# Patient Record
Sex: FEMALE | HISPANIC OR LATINO | Employment: FULL TIME | URBAN - METROPOLITAN AREA
[De-identification: names, ages, dates, MRNs, and addresses within clinical notes are randomized per-mention and may not be internally consistent; named-entity substitution may affect disease eponyms.]

---

## 2018-07-27 ENCOUNTER — APPOINTMENT (OUTPATIENT)
Dept: CT IMAGING | Facility: HOSPITAL | Age: 48
End: 2018-07-27

## 2018-07-27 ENCOUNTER — HOSPITAL ENCOUNTER (EMERGENCY)
Facility: HOSPITAL | Age: 48
Discharge: HOME | End: 2018-07-27
Attending: EMERGENCY MEDICINE | Admitting: EMERGENCY MEDICINE

## 2018-07-27 VITALS
HEIGHT: 64 IN | DIASTOLIC BLOOD PRESSURE: 90 MMHG | RESPIRATION RATE: 18 BRPM | HEART RATE: 61 BPM | BODY MASS INDEX: 27.49 KG/M2 | TEMPERATURE: 98.4 F | OXYGEN SATURATION: 99 % | SYSTOLIC BLOOD PRESSURE: 128 MMHG | WEIGHT: 161 LBS

## 2018-07-27 DIAGNOSIS — R10.31 RIGHT LOWER QUADRANT ABDOMINAL PAIN: Primary | ICD-10-CM

## 2018-07-27 LAB
ALBUMIN SERPL-MCNC: 4.4 G/DL (ref 3.5–5.2)
ALBUMIN/GLOB SERPL: 1.5 G/DL
ALP SERPL-CCNC: 53 U/L (ref 39–117)
ALT SERPL W P-5'-P-CCNC: 20 U/L (ref 1–33)
ANION GAP SERPL CALCULATED.3IONS-SCNC: 12.9 MMOL/L
AST SERPL-CCNC: 14 U/L (ref 1–32)
BACTERIA UR QL AUTO: ABNORMAL /HPF
BASOPHILS # BLD AUTO: 0.01 10*3/MM3 (ref 0–0.2)
BASOPHILS NFR BLD AUTO: 0.2 % (ref 0–1.5)
BILIRUB SERPL-MCNC: 0.3 MG/DL (ref 0.1–1.2)
BILIRUB UR QL STRIP: NEGATIVE
BUN BLD-MCNC: 8 MG/DL (ref 6–20)
BUN/CREAT SERPL: 11 (ref 7–25)
CALCIUM SPEC-SCNC: 8.4 MG/DL (ref 8.6–10.5)
CHLORIDE SERPL-SCNC: 102 MMOL/L (ref 98–107)
CLARITY UR: ABNORMAL
CO2 SERPL-SCNC: 23.1 MMOL/L (ref 22–29)
COLOR UR: YELLOW
CREAT BLD-MCNC: 0.73 MG/DL (ref 0.57–1)
DEPRECATED RDW RBC AUTO: 41.7 FL (ref 37–54)
EOSINOPHIL # BLD AUTO: 0.14 10*3/MM3 (ref 0–0.7)
EOSINOPHIL NFR BLD AUTO: 2.1 % (ref 0.3–6.2)
ERYTHROCYTE [DISTWIDTH] IN BLOOD BY AUTOMATED COUNT: 13.2 % (ref 11.7–13)
GFR SERPL CREATININE-BSD FRML MDRD: 104 ML/MIN/1.73
GFR SERPL CREATININE-BSD FRML MDRD: 86 ML/MIN/1.73
GLOBULIN UR ELPH-MCNC: 3 GM/DL
GLUCOSE BLD-MCNC: 87 MG/DL (ref 65–99)
GLUCOSE UR STRIP-MCNC: NEGATIVE MG/DL
HCG SERPL QL: NEGATIVE
HCT VFR BLD AUTO: 36.2 % (ref 35.6–45.5)
HGB BLD-MCNC: 12.1 G/DL (ref 11.9–15.5)
HGB UR QL STRIP.AUTO: ABNORMAL
HYALINE CASTS UR QL AUTO: ABNORMAL /LPF
IMM GRANULOCYTES # BLD: 0.02 10*3/MM3 (ref 0–0.03)
IMM GRANULOCYTES NFR BLD: 0.3 % (ref 0–0.5)
KETONES UR QL STRIP: NEGATIVE
LEUKOCYTE ESTERASE UR QL STRIP.AUTO: ABNORMAL
LYMPHOCYTES # BLD AUTO: 2.48 10*3/MM3 (ref 0.9–4.8)
LYMPHOCYTES NFR BLD AUTO: 37.7 % (ref 19.6–45.3)
MCH RBC QN AUTO: 28.9 PG (ref 26.9–32)
MCHC RBC AUTO-ENTMCNC: 33.4 G/DL (ref 32.4–36.3)
MCV RBC AUTO: 86.4 FL (ref 80.5–98.2)
MONOCYTES # BLD AUTO: 0.28 10*3/MM3 (ref 0.2–1.2)
MONOCYTES NFR BLD AUTO: 4.3 % (ref 5–12)
NEUTROPHILS # BLD AUTO: 3.66 10*3/MM3 (ref 1.9–8.1)
NEUTROPHILS NFR BLD AUTO: 55.7 % (ref 42.7–76)
NITRITE UR QL STRIP: NEGATIVE
PH UR STRIP.AUTO: 6 [PH] (ref 5–8)
PLATELET # BLD AUTO: 244 10*3/MM3 (ref 140–500)
PMV BLD AUTO: 10.6 FL (ref 6–12)
POTASSIUM BLD-SCNC: 3.6 MMOL/L (ref 3.5–5.2)
PROT SERPL-MCNC: 7.4 G/DL (ref 6–8.5)
PROT UR QL STRIP: NEGATIVE
RBC # BLD AUTO: 4.19 10*6/MM3 (ref 3.9–5.2)
RBC # UR: ABNORMAL /HPF
REF LAB TEST METHOD: ABNORMAL
SODIUM BLD-SCNC: 138 MMOL/L (ref 136–145)
SP GR UR STRIP: 1.02 (ref 1–1.03)
SQUAMOUS #/AREA URNS HPF: ABNORMAL /HPF
UROBILINOGEN UR QL STRIP: ABNORMAL
WBC NRBC COR # BLD: 6.57 10*3/MM3 (ref 4.5–10.7)
WBC UR QL AUTO: ABNORMAL /HPF
WHOLE BLOOD HOLD SPECIMEN: NORMAL

## 2018-07-27 PROCEDURE — 25010000002 KETOROLAC TROMETHAMINE PER 15 MG: Performed by: EMERGENCY MEDICINE

## 2018-07-27 PROCEDURE — 80053 COMPREHEN METABOLIC PANEL: CPT | Performed by: NURSE PRACTITIONER

## 2018-07-27 PROCEDURE — 25010000002 IOPAMIDOL 61 % SOLUTION: Performed by: NURSE PRACTITIONER

## 2018-07-27 PROCEDURE — 74177 CT ABD & PELVIS W/CONTRAST: CPT

## 2018-07-27 PROCEDURE — 81001 URINALYSIS AUTO W/SCOPE: CPT | Performed by: NURSE PRACTITIONER

## 2018-07-27 PROCEDURE — 84703 CHORIONIC GONADOTROPIN ASSAY: CPT | Performed by: NURSE PRACTITIONER

## 2018-07-27 PROCEDURE — 96374 THER/PROPH/DIAG INJ IV PUSH: CPT

## 2018-07-27 PROCEDURE — 99283 EMERGENCY DEPT VISIT LOW MDM: CPT

## 2018-07-27 PROCEDURE — 85025 COMPLETE CBC W/AUTO DIFF WBC: CPT | Performed by: NURSE PRACTITIONER

## 2018-07-27 RX ORDER — HYDROCODONE BITARTRATE AND ACETAMINOPHEN 5; 325 MG/1; MG/1
1 TABLET ORAL EVERY 6 HOURS PRN
Qty: 12 TABLET | Refills: 0 | Status: SHIPPED | OUTPATIENT
Start: 2018-07-27 | End: 2018-07-30

## 2018-07-27 RX ORDER — SODIUM CHLORIDE 0.9 % (FLUSH) 0.9 %
10 SYRINGE (ML) INJECTION AS NEEDED
Status: DISCONTINUED | OUTPATIENT
Start: 2018-07-27 | End: 2018-07-27 | Stop reason: HOSPADM

## 2018-07-27 RX ORDER — NAPROXEN 500 MG/1
500 TABLET ORAL 2 TIMES DAILY PRN
Qty: 10 TABLET | Refills: 0 | Status: SHIPPED | OUTPATIENT
Start: 2018-07-27 | End: 2018-08-01

## 2018-07-27 RX ORDER — KETOROLAC TROMETHAMINE 15 MG/ML
15 INJECTION, SOLUTION INTRAMUSCULAR; INTRAVENOUS ONCE
Status: COMPLETED | OUTPATIENT
Start: 2018-07-27 | End: 2018-07-27

## 2018-07-27 RX ADMIN — IOPAMIDOL 85 ML: 612 INJECTION, SOLUTION INTRAVENOUS at 14:59

## 2018-07-27 RX ADMIN — KETOROLAC TROMETHAMINE 15 MG: 15 INJECTION, SOLUTION INTRAMUSCULAR; INTRAVENOUS at 15:56

## 2018-08-27 ENCOUNTER — OFFICE VISIT (OUTPATIENT)
Dept: INTERNAL MEDICINE | Facility: CLINIC | Age: 48
End: 2018-08-27

## 2018-08-27 VITALS
HEART RATE: 80 BPM | SYSTOLIC BLOOD PRESSURE: 102 MMHG | WEIGHT: 164.9 LBS | BODY MASS INDEX: 30.35 KG/M2 | HEIGHT: 62 IN | RESPIRATION RATE: 16 BRPM | DIASTOLIC BLOOD PRESSURE: 70 MMHG | OXYGEN SATURATION: 97 %

## 2018-08-27 DIAGNOSIS — Z13.29 SCREENING FOR THYROID DISORDER: ICD-10-CM

## 2018-08-27 DIAGNOSIS — K21.00 GASTROESOPHAGEAL REFLUX DISEASE WITH ESOPHAGITIS: ICD-10-CM

## 2018-08-27 DIAGNOSIS — Z13.220 SCREENING FOR LIPID DISORDERS: ICD-10-CM

## 2018-08-27 DIAGNOSIS — Z12.39 SCREENING FOR BREAST CANCER: ICD-10-CM

## 2018-08-27 DIAGNOSIS — Z00.00 WELL WOMAN EXAM (NO GYNECOLOGICAL EXAM): Primary | ICD-10-CM

## 2018-08-27 DIAGNOSIS — E55.9 AVITAMINOSIS D: ICD-10-CM

## 2018-08-27 DIAGNOSIS — Z13.1 SCREENING FOR DIABETES MELLITUS: ICD-10-CM

## 2018-08-27 DIAGNOSIS — Z13.21 ENCOUNTER FOR VITAMIN DEFICIENCY SCREENING: ICD-10-CM

## 2018-08-27 DIAGNOSIS — Z23 NEED FOR TDAP VACCINATION: ICD-10-CM

## 2018-08-27 PROCEDURE — 99396 PREV VISIT EST AGE 40-64: CPT | Performed by: FAMILY MEDICINE

## 2018-08-27 PROCEDURE — 99213 OFFICE O/P EST LOW 20 MIN: CPT | Performed by: FAMILY MEDICINE

## 2018-08-27 PROCEDURE — 90471 IMMUNIZATION ADMIN: CPT | Performed by: FAMILY MEDICINE

## 2018-08-27 PROCEDURE — 90715 TDAP VACCINE 7 YRS/> IM: CPT | Performed by: FAMILY MEDICINE

## 2018-08-27 RX ORDER — DEXLANSOPRAZOLE 60 MG/1
60 CAPSULE, DELAYED RELEASE ORAL DAILY
Qty: 30 CAPSULE | Refills: 0 | Status: SHIPPED | OUTPATIENT
Start: 2018-08-27 | End: 2018-09-26

## 2018-08-27 RX ORDER — NAPROXEN 500 MG/1
500 TABLET ORAL 2 TIMES DAILY PRN
Refills: 0 | COMMUNITY
Start: 2018-07-28 | End: 2018-08-27

## 2018-08-27 NOTE — PROGRESS NOTES
Subjective   Minnie Ortiz is a 47 y.o. female and is here for a comprehensive physical exam. The patient reports problems - heartburn..    Pt is due for annual gyn exam and mammo     Patient notes to have heartburn.  Patient states that her acid reflux is significant.  Patient states that she is trying to decrease some of the spice level that she is currently eating.  Patient states that she is eating earlier in the evenings around 5 or 6 PM.  The past patient has tried other OTC medications.    Do you take any herbs or supplements that were not prescribed by a doctor? no      Social History:   Social History     Social History   • Marital status:      Spouse name: N/A   • Number of children: N/A   • Years of education: N/A     Occupational History   • Not on file.     Social History Main Topics   • Smoking status: Never Smoker   • Smokeless tobacco: Never Used   • Alcohol use No   • Drug use: No   • Sexual activity: Not on file     Other Topics Concern   • Not on file     Social History Narrative   • No narrative on file       Family History:   Family History   Problem Relation Age of Onset   • Hypertension Mother    • Diabetes Mother        Past Medical History:   Past Medical History:   Diagnosis Date   • Allergic            Review of Systems    Review of Systems   Constitutional: Negative for chills and fever.   HENT: Negative for congestion, rhinorrhea, sinus pain and sore throat.    Eyes: Negative for photophobia and visual disturbance.   Respiratory: Negative for cough, chest tightness and shortness of breath.    Cardiovascular: Negative for chest pain and palpitations.   Gastrointestinal: Negative for diarrhea, nausea and vomiting.   Genitourinary: Negative for dysuria, frequency and urgency.   Skin: Negative for rash and wound.   Neurological: Negative for dizziness and syncope.   Psychiatric/Behavioral: Negative for behavioral problems and confusion.       Objective   Physical Exam    Constitutional: She is oriented to person, place, and time. She appears well-developed and well-nourished.   HENT:   Head: Normocephalic and atraumatic.   Right Ear: External ear normal.   Left Ear: External ear normal.   Mouth/Throat: Oropharynx is clear and moist.   Eyes: EOM are normal.   Neck: Normal range of motion. Neck supple.   Cardiovascular: Normal rate, regular rhythm and normal heart sounds.    Pulmonary/Chest: Effort normal and breath sounds normal. No respiratory distress.   Abdominal: Soft. There is no tenderness. There is no guarding.   Musculoskeletal: Normal range of motion.   Lymphadenopathy:     She has no cervical adenopathy.   Neurological: She is alert and oriented to person, place, and time.   Skin: Skin is warm.   Psychiatric: She has a normal mood and affect. Her behavior is normal.   Nursing note and vitals reviewed.      Medications:   Current Outpatient Prescriptions:   •  dexlansoprazole (DEXILANT) 60 MG capsule, Take 1 capsule by mouth Daily for 30 days., Disp: 30 capsule, Rfl: 0       Assessment/Plan   Healthy female exam.      1. Healthcare Maintenance:  2. Patient Counseling:  --Nutrition: Stressed importance of moderation in sodium/caffeine intake, saturated fat and cholesterol, caloric balance, sufficient intake of fresh fruits, vegetables, fiber, calcium and vit D  --Exercise: Recommended 30 minutes of exercise daily.  --Immunizations reviewed.  Patient is due for TdaP.  --Discussed benefits of screening mammogram and Pap smear.  We'll refer patient to gynecology.    Diagnoses and all orders for this visit:    Well woman exam (no gynecological exam)  -     Ambulatory Referral to Gynecology  -     Comprehensive Metabolic Panel  -     CBC & Differential    Screening for diabetes mellitus  -     Hemoglobin A1c    Screening for thyroid disorder  -     Thyroid Panel With TSH    Screening for lipid disorders  -     Lipid Panel With LDL / HDL Ratio    Need for Tdap vaccination  -      Tdap Vaccine Greater Than or Equal To 6yo IM    Screening for breast cancer  -     Mammo Screening Bilateral With CAD    Gastroesophageal reflux disease with esophagitis  -     dexlansoprazole (DEXILANT) 60 MG capsule; Take 1 capsule by mouth Daily for 30 days.  -     Discussed the patient lifestyle changes, it appears the patient is already making these lifestyle changes with the decrease in spice level as well as eating earlier in the evenings.  We'll start patient on a PPI Dexilant.     Avitaminosis D  -     Vitamin D 25 Hydroxy    Encounter for vitamin deficiency screening  -     Vitamin D 25 Hydroxy    Other orders  -     Discontinue: naproxen (NAPROSYN) 500 MG tablet; Take 500 mg by mouth 2 (Two) Times a Day As Needed for Mild Pain .        No Follow-up on file.             Dictated utilizing Dragon Voice Recognition Software

## 2018-08-28 LAB
25(OH)D3+25(OH)D2 SERPL-MCNC: 13.9 NG/ML (ref 30–100)
ALBUMIN SERPL-MCNC: 4.4 G/DL (ref 3.5–5.2)
ALBUMIN/GLOB SERPL: 1.5 G/DL
ALP SERPL-CCNC: 54 U/L (ref 39–117)
ALT SERPL-CCNC: 28 U/L (ref 1–33)
AST SERPL-CCNC: 22 U/L (ref 1–32)
BASOPHILS # BLD AUTO: 0.02 10*3/MM3 (ref 0–0.2)
BASOPHILS NFR BLD AUTO: 0.2 % (ref 0–1.5)
BILIRUB SERPL-MCNC: 0.3 MG/DL (ref 0.1–1.2)
BUN SERPL-MCNC: 7 MG/DL (ref 6–20)
BUN/CREAT SERPL: 10.9 (ref 7–25)
CALCIUM SERPL-MCNC: 9.4 MG/DL (ref 8.6–10.5)
CHLORIDE SERPL-SCNC: 104 MMOL/L (ref 98–107)
CHOLEST SERPL-MCNC: 182 MG/DL (ref 0–200)
CO2 SERPL-SCNC: 21.3 MMOL/L (ref 22–29)
CREAT SERPL-MCNC: 0.64 MG/DL (ref 0.57–1)
EOSINOPHIL # BLD AUTO: 0.18 10*3/MM3 (ref 0–0.7)
EOSINOPHIL NFR BLD AUTO: 2 % (ref 0.3–6.2)
ERYTHROCYTE [DISTWIDTH] IN BLOOD BY AUTOMATED COUNT: 13.1 % (ref 11.7–13)
FT4I SERPL CALC-MCNC: 1.6 (ref 1.2–4.9)
GLOBULIN SER CALC-MCNC: 3 GM/DL
GLUCOSE SERPL-MCNC: 84 MG/DL (ref 65–99)
HBA1C MFR BLD: 5.4 % (ref 4.8–5.6)
HCT VFR BLD AUTO: 40.4 % (ref 35.6–45.5)
HDLC SERPL-MCNC: 42 MG/DL (ref 40–60)
HGB BLD-MCNC: 12.8 G/DL (ref 11.9–15.5)
IMM GRANULOCYTES # BLD: 0 10*3/MM3 (ref 0–0.03)
IMM GRANULOCYTES NFR BLD: 0 % (ref 0–0.5)
LDLC SERPL CALC-MCNC: 106 MG/DL (ref 0–100)
LDLC/HDLC SERPL: 2.53 {RATIO}
LYMPHOCYTES # BLD AUTO: 2.82 10*3/MM3 (ref 0.9–4.8)
LYMPHOCYTES NFR BLD AUTO: 31.5 % (ref 19.6–45.3)
MCH RBC QN AUTO: 28.2 PG (ref 26.9–32)
MCHC RBC AUTO-ENTMCNC: 31.7 G/DL (ref 32.4–36.3)
MCV RBC AUTO: 89 FL (ref 80.5–98.2)
MONOCYTES # BLD AUTO: 0.33 10*3/MM3 (ref 0.2–1.2)
MONOCYTES NFR BLD AUTO: 3.7 % (ref 5–12)
NEUTROPHILS # BLD AUTO: 5.61 10*3/MM3 (ref 1.9–8.1)
NEUTROPHILS NFR BLD AUTO: 62.6 % (ref 42.7–76)
PLATELET # BLD AUTO: 289 10*3/MM3 (ref 140–500)
POTASSIUM SERPL-SCNC: 4.2 MMOL/L (ref 3.5–5.2)
PROT SERPL-MCNC: 7.4 G/DL (ref 6–8.5)
RBC # BLD AUTO: 4.54 10*6/MM3 (ref 3.9–5.2)
SODIUM SERPL-SCNC: 139 MMOL/L (ref 136–145)
T3RU NFR SERPL: 20 % (ref 24–39)
T4 SERPL-MCNC: 8 UG/DL (ref 4.5–12)
TRIGL SERPL-MCNC: 169 MG/DL (ref 0–150)
TSH SERPL DL<=0.005 MIU/L-ACNC: 3.13 UIU/ML (ref 0.45–4.5)
VLDLC SERPL CALC-MCNC: 33.8 MG/DL (ref 5–40)
WBC # BLD AUTO: 8.96 10*3/MM3 (ref 4.5–10.7)

## 2018-08-28 NOTE — PROGRESS NOTES
Please inform the patient of the following abnormal results.  Patients LDL is elevated.  Patient is recommended to exercise and diet.  Will recheck lipid panel in 6 months.  If elevated that time, patient will be started on statin.  Patient has vitamin D deficiency.  Start patient on right thousand IUs of vitamin D daily.  Recheck lipids and vitamin D, CMP and CBC 1 week prior to office visit.  Patient should be reevaluated in 6 months.

## 2018-08-31 ENCOUNTER — TELEPHONE (OUTPATIENT)
Dept: INTERNAL MEDICINE | Facility: CLINIC | Age: 48
End: 2018-08-31

## 2018-09-06 ENCOUNTER — TELEPHONE (OUTPATIENT)
Dept: INTERNAL MEDICINE | Facility: CLINIC | Age: 48
End: 2018-09-06

## 2018-09-06 DIAGNOSIS — E55.9 VITAMIN D DEFICIENCY: ICD-10-CM

## 2018-09-06 DIAGNOSIS — E78.49 OTHER HYPERLIPIDEMIA: Primary | ICD-10-CM

## 2018-09-06 DIAGNOSIS — Z51.81 ENCOUNTER FOR MEDICATION MONITORING: ICD-10-CM

## 2018-09-06 NOTE — TELEPHONE ENCOUNTER
----- Message from Ambrose Phillips MD sent at 8/31/2018 12:56 PM EDT -----  Please inform the patient of the following abnormal results.  Start 5000 iu daily

## 2018-09-06 NOTE — TELEPHONE ENCOUNTER
LVM- patient notified. Patient advised to contact office if they have any questions. Prescription for vitamin D sent to pharmacy. Lab orders put into [facility] for 6 months. Patient has a 2 month follow up scheduled for 10/26/18. Six month follow up appointment/six month lab appointment can be scheduled at that time.

## 2018-09-13 ENCOUNTER — APPOINTMENT (OUTPATIENT)
Dept: MAMMOGRAPHY | Facility: HOSPITAL | Age: 48
End: 2018-09-13

## 2018-09-24 ENCOUNTER — HOSPITAL ENCOUNTER (OUTPATIENT)
Dept: MAMMOGRAPHY | Facility: HOSPITAL | Age: 48
Discharge: HOME | End: 2018-09-24
Admitting: FAMILY MEDICINE

## 2018-09-24 PROCEDURE — 77067 SCR MAMMO BI INCL CAD: CPT

## 2018-09-28 ENCOUNTER — TELEPHONE (OUTPATIENT)
Dept: INTERNAL MEDICINE | Facility: CLINIC | Age: 48
End: 2018-09-28

## 2018-10-15 ENCOUNTER — OFFICE VISIT (OUTPATIENT)
Dept: OBSTETRICS AND GYNECOLOGY | Age: 48
End: 2018-10-15

## 2018-10-15 VITALS
DIASTOLIC BLOOD PRESSURE: 64 MMHG | BODY MASS INDEX: 30.11 KG/M2 | HEIGHT: 62 IN | SYSTOLIC BLOOD PRESSURE: 114 MMHG | WEIGHT: 163.6 LBS

## 2018-10-15 DIAGNOSIS — Z12.4 SCREENING FOR CERVICAL CANCER: Primary | ICD-10-CM

## 2018-10-15 DIAGNOSIS — R10.2 PELVIC PAIN: ICD-10-CM

## 2018-10-15 DIAGNOSIS — N39.3 STRESS INCONTINENCE: ICD-10-CM

## 2018-10-15 PROCEDURE — 99386 PREV VISIT NEW AGE 40-64: CPT | Performed by: OBSTETRICS & GYNECOLOGY

## 2018-10-15 NOTE — PROGRESS NOTES
Routine Annual Visit    10/15/2018    Patient: Minnie Ortiz          MR#:[id number]      Chief Complaint   Patient presents with   • Gynecologic Exam     annual        History of Present Illness    47 y.o. female  who presents for annual exam. Went to the ER with abdominal pain recently, was RLQ and excruciating. It has been going on for about three months and seems to come and go. Had normal CT scan then.        Patient's last menstrual period was 10/08/2018 (exact date).  Obstetric History:  OB History      Para Term  AB Living    2 2 2          SAB TAB Ectopic Molar Multiple Live Births                        Menstrual History:     Patient's last menstrual period was 10/08/2018 (exact date).       Sexual History:   active, no issues. Hx Tubal    ________________________________________  Patient Active Problem List   Diagnosis   • Other hyperlipidemia   • Vitamin D deficiency   • Encounter for medication monitoring   • Stress incontinence   • Pelvic pain       Past Medical History:   Diagnosis Date   • Allergic      Family History   Problem Relation Age of Onset   • Hypertension Mother    • Diabetes Mother        History reviewed. No pertinent surgical history.    History   Smoking Status   • Never Smoker   Smokeless Tobacco   • Never Used       has a current medication list which includes the following prescription(s): vitamin d3.  ________________________________________    Current contraception: tubal ligation  History of abnormal Pap smear: no  Family history of Breast cancer: no  Family history of uterine or ovarian cancer: no  Family History of colon cancer/colon polyps: no  History of abnormal mammogram: no      The following portions of the patient's history were reviewed and updated as appropriate: allergies, current medications, past family history, past medical history, past social history, past surgical history and problem list.    Review of Systems    A comprehensive review  "of systems was negative except for: occasional constipation, intermittent abd pain     Objective   Physical Exam    /64   Ht 158.1 cm (62.25\")   Wt 74.2 kg (163 lb 9.6 oz)   LMP 10/08/2018 (Exact Date)   BMI 29.68 kg/m²    BP Readings from Last 3 Encounters:   10/15/18 114/64   08/27/18 102/70   07/27/18 128/90      Wt Readings from Last 3 Encounters:   10/15/18 74.2 kg (163 lb 9.6 oz)   08/27/18 74.8 kg (164 lb 14.4 oz)   07/27/18 73 kg (161 lb)         BMI: Body mass index is 29.68 kg/m².       General:   alert, appears stated age and cooperative   Heart:: regular rate and rhythm, S1, S2 normal, no murmur, click, rub or gallop   Lungs: normal respiratory effort and auscultation   Abdomen: soft, non-tender, without masses or organomegaly   Breast: inspection negative, normal nipple, no skin changes, one well circumscribed and smooth pea-sized density right breast at 7 o clock but is mobile. left breast no abnormalities   Urethra and bladder: urethral meatus normal; bladder nontender to palpation;   Vulva: normal, Bartholin's, Urethra, Bulger's normal   Vagina: normal mucosa, normal discharge   Cervix: anteverted   Uterus: normal size or anteverted   Adnexa: normal adnexa and no mass, fullness, tenderness       Assessment:    normal annual exam       Plan:    Plan     []  Mammogram request made- recently complete and normal. Right breast area feels benign on exam no abnl on mammogram  [x]  PAP done  []  Labs:   []  GC/Chl/TV  []  DEXA scan   []  Referral for colonoscopy:     Problems Addressed this Visit     Stress incontinence     Occasional stress incont, not troublesome to patient.         Pelvic pain     Occasional RLQ abd pain, plan to follow up with GYN sonogram            Other Visit Diagnoses     Screening for cervical cancer    -  Primary    Relevant Orders    PapIG, HPV, Rfx 16 / 18        Counseling  []  Nutrition  [x]  Physical activity/regular exercise   []  Healthy weight  []  Injury " prevention  []  Smoking cessation  []  Substance misuse/abuse  [x]  Sexual behavior  []  STD prevention  [x]  Contraception  []  Dental health  []  Mental health  []  Immunization  []  Encouraged SBE        Perla Ho MD  10/15/2018  11:34 AM

## 2018-10-19 LAB
CYTOLOGIST CVX/VAG CYTO: NORMAL
CYTOLOGY CVX/VAG DOC THIN PREP: NORMAL
DX ICD CODE: NORMAL
HIV 1 & 2 AB SER-IMP: NORMAL
HPV I/H RISK 1 DNA CVX QL PROBE+SIG AMP: NEGATIVE
OTHER STN SPEC: NORMAL
PATH REPORT.FINAL DX SPEC: NORMAL
PATHOLOGIST CVX/VAG CYTO: NORMAL
STAT OF ADQ CVX/VAG CYTO-IMP: NORMAL

## 2018-10-22 ENCOUNTER — TELEPHONE (OUTPATIENT)
Dept: OBSTETRICS AND GYNECOLOGY | Age: 48
End: 2018-10-22

## 2018-10-23 ENCOUNTER — TELEPHONE (OUTPATIENT)
Dept: INTERNAL MEDICINE | Facility: CLINIC | Age: 48
End: 2018-10-23

## 2018-10-26 ENCOUNTER — OFFICE VISIT (OUTPATIENT)
Dept: INTERNAL MEDICINE | Facility: CLINIC | Age: 48
End: 2018-10-26

## 2018-10-26 VITALS
OXYGEN SATURATION: 98 % | HEIGHT: 62 IN | WEIGHT: 161.4 LBS | RESPIRATION RATE: 16 BRPM | DIASTOLIC BLOOD PRESSURE: 70 MMHG | HEART RATE: 81 BPM | BODY MASS INDEX: 29.7 KG/M2 | SYSTOLIC BLOOD PRESSURE: 116 MMHG

## 2018-10-26 DIAGNOSIS — S39.011A STRAIN OF ABDOMINAL MUSCLE, INITIAL ENCOUNTER: Primary | ICD-10-CM

## 2018-10-26 DIAGNOSIS — E55.9 VITAMIN D DEFICIENCY: ICD-10-CM

## 2018-10-26 DIAGNOSIS — K21.9 GASTROESOPHAGEAL REFLUX DISEASE WITHOUT ESOPHAGITIS: ICD-10-CM

## 2018-10-26 LAB — 25(OH)D3+25(OH)D2 SERPL-MCNC: 17.8 NG/ML (ref 30–100)

## 2018-10-26 PROCEDURE — 99214 OFFICE O/P EST MOD 30 MIN: CPT | Performed by: FAMILY MEDICINE

## 2018-10-26 RX ORDER — DEXLANSOPRAZOLE 60 MG/1
60 CAPSULE, DELAYED RELEASE ORAL DAILY
COMMUNITY
End: 2019-04-23 | Stop reason: SDUPTHER

## 2018-10-26 NOTE — PROGRESS NOTES
Subjective   Minnie Ortiz is a 47 y.o. female.     Chief Complaint   Patient presents with   • Follow-up     for GERD, still having abd pain. didnt take medications   • Ankle Pain     right side,  no recent injury, but past injury         History of Present Illness     Patient notes to have vitamin d deficiency. Patient is to take 5000 IU daily. She denies any side effects of the medication. States that the medication works well for her.    Patient notes that she has GERD. She notes that when she takes the dexilant, it is helping her. She denies any side effects of the medication.    Patient notes that she has been exercising, and notes that her right side of her abdomen hurts. She states that it feels sore. Patient notes that there are no abnormality with her bowels. She also notes no fever. The pain is in her RLQ. Pain is worse on movement, and notes its been there for the last 5 days. She notes its worse with exercise, and that it all started after she did abdominal exercises.     The following portions of the patient's history were reviewed and updated as appropriate: allergies, current medications, past family history, past medical history, past social history, past surgical history and problem list.    Review of Systems   Constitutional: Negative for chills and fever.   HENT: Negative for congestion, rhinorrhea, sinus pain and sore throat.    Eyes: Negative for photophobia and visual disturbance.   Respiratory: Negative for cough, chest tightness and shortness of breath.    Cardiovascular: Negative for chest pain and palpitations.   Gastrointestinal: Negative for diarrhea, nausea and vomiting.   Genitourinary: Negative for dysuria, frequency and urgency.   Skin: Negative for rash and wound.   Neurological: Negative for dizziness and syncope.   Psychiatric/Behavioral: Negative for behavioral problems and confusion.       Objective   Physical Exam   Constitutional: She is oriented to person, place, and  time. She appears well-developed and well-nourished.   HENT:   Head: Normocephalic and atraumatic.   Right Ear: External ear normal.   Left Ear: External ear normal.   Nose: Nose normal.   Eyes: EOM are normal.   Neck: Normal range of motion. Neck supple.   Cardiovascular: Normal rate, regular rhythm and normal heart sounds.    Pulmonary/Chest: Effort normal and breath sounds normal. No respiratory distress.   Abdominal: Soft. There is no tenderness. There is no guarding.   RLQ pain not tender to deep palpation, no guarding or rebound tenderness.    Musculoskeletal: Normal range of motion.   Lymphadenopathy:     She has no cervical adenopathy.   Neurological: She is alert and oriented to person, place, and time.   Skin: Skin is warm.   Psychiatric: She has a normal mood and affect. Her behavior is normal.   Nursing note and vitals reviewed.      Assessment/Plan   Minnie was seen today for follow-up and ankle pain.    Diagnoses and all orders for this visit:    Strain of abdominal muscle, initial encounter        -      Recommended small course of nsaids. Advised to stretch before exercise. Discussed with patient that if signs or symptoms worsen, or other symptoms like fever were to occur, she will need to go to ER.       Gastroesophageal reflux disease without esophagitis        -      Continue Dexilant.     Vitamin D deficiency  -     Vitamin D 25 Hydroxy          No Follow-up on file.    Dictated utilizing Dragon Voice Recognition Software

## 2018-10-27 NOTE — PROGRESS NOTES
Please inform the patient of the following abnormal results.  Needs to continue 5000 IU of vitamin D daily.

## 2018-10-31 ENCOUNTER — TELEPHONE (OUTPATIENT)
Dept: INTERNAL MEDICINE | Facility: CLINIC | Age: 48
End: 2018-10-31

## 2018-10-31 NOTE — TELEPHONE ENCOUNTER
----- Message from Ambrose Phillips MD sent at 10/27/2018 10:48 AM EDT -----  Please inform the patient of the following abnormal results.  Needs to continue 5000 IU of vitamin D daily.

## 2018-11-16 ENCOUNTER — OFFICE VISIT (OUTPATIENT)
Dept: INTERNAL MEDICINE | Facility: CLINIC | Age: 48
End: 2018-11-16

## 2018-11-16 VITALS
HEART RATE: 88 BPM | BODY MASS INDEX: 29.17 KG/M2 | SYSTOLIC BLOOD PRESSURE: 90 MMHG | OXYGEN SATURATION: 97 % | WEIGHT: 158.5 LBS | HEIGHT: 62 IN | DIASTOLIC BLOOD PRESSURE: 62 MMHG

## 2018-11-16 DIAGNOSIS — R10.9 ABDOMINAL CRAMPS: ICD-10-CM

## 2018-11-16 DIAGNOSIS — R10.13 ABDOMINAL PAIN, EPIGASTRIC: Primary | ICD-10-CM

## 2018-11-16 DIAGNOSIS — K21.9 GASTROESOPHAGEAL REFLUX DISEASE WITHOUT ESOPHAGITIS: ICD-10-CM

## 2018-11-16 PROCEDURE — 99214 OFFICE O/P EST MOD 30 MIN: CPT | Performed by: FAMILY MEDICINE

## 2018-11-16 RX ORDER — DICYCLOMINE HYDROCHLORIDE 10 MG/1
10 CAPSULE ORAL 3 TIMES DAILY PRN
Qty: 30 CAPSULE | Refills: 3 | Status: SHIPPED | OUTPATIENT
Start: 2018-11-16 | End: 2019-09-13

## 2018-11-17 NOTE — PROGRESS NOTES
Subjective   Minnie Ortiz is a 48 y.o. female.     Chief Complaint   Patient presents with   • Abdominal Pain     RLQ         History of Present Illness     Patient continues to still have abdominal pain. Patient notes she has epigastric pain as well has pain in the RLQ. The pain has been intermittent since the summer. Patient notes that she has had a CT scan done over the summer which did not reveal anything. She also notes that the dexilant she is taking for her GERD is not helping either along with her diet changes. The patient states that the pain in the RLQ feels as if its intermittent cramps.    The following portions of the patient's history were reviewed and updated as appropriate: allergies, current medications, past family history, past medical history, past social history, past surgical history and problem list.    Review of Systems   Constitutional: Negative for chills and fever.   HENT: Negative for congestion, rhinorrhea, sinus pain and sore throat.    Eyes: Negative for photophobia and visual disturbance.   Respiratory: Negative for cough, chest tightness and shortness of breath.    Cardiovascular: Negative for chest pain and palpitations.   Gastrointestinal: Positive for abdominal pain. Negative for diarrhea, nausea and vomiting.   Genitourinary: Negative for dysuria, frequency and urgency.   Skin: Negative for rash and wound.   Neurological: Negative for dizziness and syncope.   Psychiatric/Behavioral: Negative for behavioral problems and confusion.       Objective   Physical Exam   Constitutional: She is oriented to person, place, and time. She appears well-developed and well-nourished.   HENT:   Head: Normocephalic and atraumatic.   Right Ear: External ear normal.   Left Ear: External ear normal.   Eyes: EOM are normal.   Neck: Normal range of motion. Neck supple.   Cardiovascular: Normal rate, regular rhythm and normal heart sounds.   Pulmonary/Chest: Effort normal and breath sounds  normal. No respiratory distress.   Abdominal: Soft. She exhibits no distension. There is no tenderness.   Musculoskeletal: Normal range of motion.   Lymphadenopathy:     She has no cervical adenopathy.   Neurological: She is alert and oriented to person, place, and time.   Skin: Skin is warm.   Psychiatric: She has a normal mood and affect. Her behavior is normal.   Nursing note and vitals reviewed.      Assessment/Plan   Minnie was seen today for abdominal pain.    Diagnoses and all orders for this visit:    Abdominal pain, epigastric  -     Ambulatory Referral to Gastroenterology    Gastroesophageal reflux disease without esophagitis  -     Ambulatory Referral to Gastroenterology  -     Continue dexilant and lifestyle modification.     Abdominal cramps  -     dicyclomine (BENTYL) 10 MG capsule; Take 1 capsule by mouth 3 (Three) Times a Day As Needed (abdominal cramps).          No Follow-up on file.    Dictated utilizing Dragon Voice Recognition Software

## 2018-12-19 ENCOUNTER — OFFICE VISIT (OUTPATIENT)
Dept: GASTROENTEROLOGY | Facility: CLINIC | Age: 48
End: 2018-12-19

## 2018-12-19 VITALS
BODY MASS INDEX: 26.6 KG/M2 | TEMPERATURE: 98.5 F | HEIGHT: 64 IN | SYSTOLIC BLOOD PRESSURE: 104 MMHG | DIASTOLIC BLOOD PRESSURE: 72 MMHG | WEIGHT: 155.8 LBS

## 2018-12-19 DIAGNOSIS — R10.31 RIGHT LOWER QUADRANT ABDOMINAL PAIN: Primary | ICD-10-CM

## 2018-12-19 DIAGNOSIS — K76.0 HEPATIC STEATOSIS: ICD-10-CM

## 2018-12-19 DIAGNOSIS — R14.2 BELCHING: ICD-10-CM

## 2018-12-19 PROCEDURE — 99204 OFFICE O/P NEW MOD 45 MIN: CPT | Performed by: INTERNAL MEDICINE

## 2018-12-19 NOTE — PROGRESS NOTES
Chief Complaint   Patient presents with   • Abdominal Pain       Subjective     HPI    Minnie Ortiz is a 48 y.o. female with a past medical history noted below who presents for evaluation of abdominal pain.  This has been an issue for a number of months.  Pain is located in the R lower quadrant.  Timing is intermittent.  Pain is described as cramping.  Not related to eating or BMs.  The pain does cause her to burp.  She had a severe episode in July leading to an ER visit.  Her CT imaging was negative.  She was sent out with some naproxen.  She did see her gynecologist in October.  A transvaginal ultrasound was recommended for further workup which she did not pursue due to her co-pay.  Her last bad episode was in November.    No GI malignancies in her family.  No smoking, occasional ETOH.  She works at [employer].  No abdominal surgeries      Past Medical History:   Diagnosis Date   • Allergic          Current Outpatient Medications:   •  Cholecalciferol (VITAMIN D3) 5000 units capsule capsule, Take 1 capsule by mouth Daily., Disp: 90 capsule, Rfl: 3  •  dexlansoprazole (DEXILANT) 60 MG capsule, Take 60 mg by mouth Daily., Disp: , Rfl:   •  dicyclomine (BENTYL) 10 MG capsule, Take 1 capsule by mouth 3 (Three) Times a Day As Needed (abdominal cramps)., Disp: 30 capsule, Rfl: 3  •  Simethicone 80 MG tablet, Take 160 mg by mouth 3 (Three) Times a Day Before Meals., Disp: 90 each, Rfl: 3    No Known Allergies    Social History     Socioeconomic History   • Marital status:      Spouse name: Not on file   • Number of children: Not on file   • Years of education: Not on file   • Highest education level: Not on file   Social Needs   • Financial resource strain: Not on file   • Food insecurity - worry: Not on file   • Food insecurity - inability: Not on file   • Transportation needs - medical: Not on file   • Transportation needs - non-medical: Not on file   Occupational History   • Not on file   Tobacco Use    • Smoking status: Never Smoker   • Smokeless tobacco: Never Used   Substance and Sexual Activity   • Alcohol use: No   • Drug use: No   • Sexual activity: Yes     Partners: Male     Birth control/protection: None   Other Topics Concern   • Not on file   Social History Narrative   • Not on file       Family History   Problem Relation Age of Onset   • Hypertension Mother    • Diabetes Mother        Review of Systems   Constitutional: Negative for activity change, appetite change and fatigue.   HENT: Negative for sore throat and trouble swallowing.    Respiratory: Negative.    Cardiovascular: Negative.    Gastrointestinal: Positive for abdominal pain. Negative for abdominal distention and blood in stool.   Endocrine: Negative for cold intolerance and heat intolerance.   Genitourinary: Negative for difficulty urinating, dysuria and frequency.   Musculoskeletal: Negative for arthralgias, back pain and myalgias.   Skin: Negative.    Hematological: Negative for adenopathy. Does not bruise/bleed easily.   All other systems reviewed and are negative.      Objective     Vitals:    12/19/18 1311   BP: 104/72   Temp: 98.5 °F (36.9 °C)         12/19/18  1311   Weight: 70.7 kg (155 lb 12.8 oz)     Body mass index is 26.74 kg/m².    Physical Exam   Constitutional: She is oriented to person, place, and time. She appears well-developed and well-nourished. No distress.   HENT:   Head: Normocephalic and atraumatic.   Right Ear: External ear normal.   Left Ear: External ear normal.   Nose: Nose normal.   Mouth/Throat: Oropharynx is clear and moist.   Eyes: Conjunctivae and EOM are normal. Right eye exhibits no discharge. Left eye exhibits no discharge. No scleral icterus.   Neck: Normal range of motion. Neck supple. No thyromegaly present.   No supraclavicular adenopathy   Cardiovascular: Normal rate, regular rhythm, normal heart sounds and intact distal pulses. Exam reveals no gallop.   No murmur heard.  No lower extremity edema    Pulmonary/Chest: Effort normal and breath sounds normal. No respiratory distress. She has no wheezes.   Abdominal: Soft. Normal appearance and bowel sounds are normal. She exhibits no distension and no mass. There is no hepatosplenomegaly. There is tenderness. There is no rigidity, no rebound and no guarding. No hernia.       She is actually tenderness suprapubic region and palpation does cause her to belch   Genitourinary:   Genitourinary Comments: Rectal exam deferred   Musculoskeletal: Normal range of motion. She exhibits no edema or tenderness.   No atrophy of upper or lower extremities.  Normal digits and nails of both hands.   Lymphadenopathy:     She has no cervical adenopathy.   Neurological: She is alert and oriented to person, place, and time. She displays no atrophy. Coordination normal.   Skin: Skin is warm and dry. No rash noted. She is not diaphoretic. No erythema.   Psychiatric: She has a normal mood and affect. Her behavior is normal. Judgment and thought content normal.   Vitals reviewed.      WBC   Date Value Ref Range Status   08/27/2018 8.96 4.50 - 10.70 10*3/mm3 Final     RBC   Date Value Ref Range Status   08/27/2018 4.54 3.90 - 5.20 10*6/mm3 Final     Hemoglobin   Date Value Ref Range Status   08/27/2018 12.8 11.9 - 15.5 g/dL Final   07/27/2018 12.1 11.9 - 15.5 g/dL Final     Hematocrit   Date Value Ref Range Status   08/27/2018 40.4 35.6 - 45.5 % Final   07/27/2018 36.2 35.6 - 45.5 % Final     MCV   Date Value Ref Range Status   08/27/2018 89.0 80.5 - 98.2 fL Final   07/27/2018 86.4 80.5 - 98.2 fL Final     MCH   Date Value Ref Range Status   08/27/2018 28.2 26.9 - 32.0 pg Final   07/27/2018 28.9 26.9 - 32.0 pg Final     MCHC   Date Value Ref Range Status   08/27/2018 31.7 (L) 32.4 - 36.3 g/dL Final   07/27/2018 33.4 32.4 - 36.3 g/dL Final     RDW   Date Value Ref Range Status   08/27/2018 13.1 (H) 11.7 - 13.0 % Final   07/27/2018 13.2 (H) 11.7 - 13.0 % Final     RDW-SD   Date Value Ref  Range Status   07/27/2018 41.7 37.0 - 54.0 fl Final     MPV   Date Value Ref Range Status   07/27/2018 10.6 6.0 - 12.0 fL Final     Platelets   Date Value Ref Range Status   08/27/2018 289 140 - 500 10*3/mm3 Final   07/27/2018 244 140 - 500 10*3/mm3 Final     Neutrophil %   Date Value Ref Range Status   07/27/2018 55.7 42.7 - 76.0 % Final     Neutrophil Rel %   Date Value Ref Range Status   08/27/2018 62.6 42.7 - 76.0 % Final     Lymphocyte %   Date Value Ref Range Status   07/27/2018 37.7 19.6 - 45.3 % Final     Lymphocyte Rel %   Date Value Ref Range Status   08/27/2018 31.5 19.6 - 45.3 % Final     Monocyte %   Date Value Ref Range Status   07/27/2018 4.3 (L) 5.0 - 12.0 % Final     Monocyte Rel %   Date Value Ref Range Status   08/27/2018 3.7 (L) 5.0 - 12.0 % Final     Eosinophil %   Date Value Ref Range Status   07/27/2018 2.1 0.3 - 6.2 % Final     Eosinophil Rel %   Date Value Ref Range Status   08/27/2018 2.0 0.3 - 6.2 % Final     Basophil %   Date Value Ref Range Status   07/27/2018 0.2 0.0 - 1.5 % Final     Basophil Rel %   Date Value Ref Range Status   08/27/2018 0.2 0.0 - 1.5 % Final     Immature Grans %   Date Value Ref Range Status   07/27/2018 0.3 0.0 - 0.5 % Final     Neutrophils, Absolute   Date Value Ref Range Status   07/27/2018 3.66 1.90 - 8.10 10*3/mm3 Final     Neutrophils Absolute   Date Value Ref Range Status   08/27/2018 5.61 1.90 - 8.10 10*3/mm3 Final     Lymphocytes, Absolute   Date Value Ref Range Status   07/27/2018 2.48 0.90 - 4.80 10*3/mm3 Final     Lymphocytes Absolute   Date Value Ref Range Status   08/27/2018 2.82 0.90 - 4.80 10*3/mm3 Final     Monocytes, Absolute   Date Value Ref Range Status   07/27/2018 0.28 0.20 - 1.20 10*3/mm3 Final     Monocytes Absolute   Date Value Ref Range Status   08/27/2018 0.33 0.20 - 1.20 10*3/mm3 Final     Eosinophils, Absolute   Date Value Ref Range Status   07/27/2018 0.14 0.00 - 0.70 10*3/mm3 Final     Eosinophils Absolute   Date Value Ref Range  Status   08/27/2018 0.18 0.00 - 0.70 10*3/mm3 Final     Basophils, Absolute   Date Value Ref Range Status   07/27/2018 0.01 0.00 - 0.20 10*3/mm3 Final     Basophils Absolute   Date Value Ref Range Status   08/27/2018 0.02 0.00 - 0.20 10*3/mm3 Final     Immature Grans, Absolute   Date Value Ref Range Status   07/27/2018 0.02 0.00 - 0.03 10*3/mm3 Final       Glucose   Date Value Ref Range Status   07/27/2018 87 65 - 99 mg/dL Final     Sodium   Date Value Ref Range Status   08/27/2018 139 136 - 145 mmol/L Final   07/27/2018 138 136 - 145 mmol/L Final     Potassium   Date Value Ref Range Status   08/27/2018 4.2 3.5 - 5.2 mmol/L Final   07/27/2018 3.6 3.5 - 5.2 mmol/L Final     CO2   Date Value Ref Range Status   07/27/2018 23.1 22.0 - 29.0 mmol/L Final     Total CO2   Date Value Ref Range Status   08/27/2018 21.3 (L) 22.0 - 29.0 mmol/L Final     Chloride   Date Value Ref Range Status   08/27/2018 104 98 - 107 mmol/L Final   07/27/2018 102 98 - 107 mmol/L Final     Anion Gap   Date Value Ref Range Status   07/27/2018 12.9 mmol/L Final     Creatinine   Date Value Ref Range Status   08/27/2018 0.64 0.57 - 1.00 mg/dL Final   07/27/2018 0.73 0.57 - 1.00 mg/dL Final     BUN   Date Value Ref Range Status   08/27/2018 7 6 - 20 mg/dL Final   07/27/2018 8 6 - 20 mg/dL Final     BUN/Creatinine Ratio   Date Value Ref Range Status   08/27/2018 10.9 7.0 - 25.0 Final   07/27/2018 11.0 7.0 - 25.0 Final     Calcium   Date Value Ref Range Status   08/27/2018 9.4 8.6 - 10.5 mg/dL Final   07/27/2018 8.4 (L) 8.6 - 10.5 mg/dL Final     eGFR Non  Amer   Date Value Ref Range Status   07/27/2018 86 >60 mL/min/1.73 Final     eGFR Non  Am   Date Value Ref Range Status   08/27/2018 100 >60 mL/min/1.73 Final     Alkaline Phosphatase   Date Value Ref Range Status   08/27/2018 54 39 - 117 U/L Final   07/27/2018 53 39 - 117 U/L Final     Total Protein   Date Value Ref Range Status   07/27/2018 7.4 6.0 - 8.5 g/dL Final     ALT (SGPT)    Date Value Ref Range Status   08/27/2018 28 1 - 33 U/L Final   07/27/2018 20 1 - 33 U/L Final     AST (SGOT)   Date Value Ref Range Status   08/27/2018 22 1 - 32 U/L Final   07/27/2018 14 1 - 32 U/L Final     Total Bilirubin   Date Value Ref Range Status   08/27/2018 0.3 0.1 - 1.2 mg/dL Final   07/27/2018 0.3 0.1 - 1.2 mg/dL Final     Albumin   Date Value Ref Range Status   08/27/2018 4.40 3.50 - 5.20 g/dL Final   07/27/2018 4.40 3.50 - 5.20 g/dL Final     Globulin   Date Value Ref Range Status   07/27/2018 3.0 gm/dL Final     A/G Ratio   Date Value Ref Range Status   08/27/2018 1.5 g/dL Final         CT ABDOMEN AND PELVIS WITH IV CONTRAST     HISTORY: Right lower quadrant pain for 5 days     TECHNIQUE: Radiation dose reduction techniques were utilized, including  automated exposure control and exposure modulation based on body size.   3 mm images were obtained through the abdomen and pelvis after the  administration of IV contrast.      COMPARISON: None     FINDINGS:        The spleen, pancreas, kidneys and adrenal glands have a normal  postcontrast CT appearance.     Mild hepatic steatosis. Subcentimeter hypodensity within the liver is  too small to characterize.     The gallbladder is unremarkable.     The bowel is normal in appearance. The appendix is normal.     The bladder is unremarkable for its degree of distension. Tubal ligation  clips are present within the adnexa.     There is no free intraperitoneal fluid or air.     There are no suspicious lytic or blastic bony lesions.     IMPRESSION:  1.  Mild hepatic steatosis. Otherwise, no findings of acute  intraabdominal pathology.     This report was finalized on 7/27/2018 3:27 PM by Dr. Edil Malik M.D.      No notes on file    Assessment/Plan    Right lower quadrant abdominal pain: This is actually in the suprapubic region.  No abnormality on her recent CT scan in this area.  She was to have ultrasound imaging with her gynecologist that she has not had  done yet    Belching: Associated with above, which is very unusual given the very low location of the pain    Hepatic steatosis: As per imaging, normal transaminases    Plan  We'll refer her back to her gynecologist, , to follow-up with the ultrasound testing that was previously recommended.  Simethicone 3 times a day (prescription given to patient per her preference) with belching  Continue efforts at weight loss with hepatic steatosis, she is are lost about 10 pounds over the past few months    Minnie was seen today for abdominal pain.    Diagnoses and all orders for this visit:    Right lower quadrant abdominal pain  -     Ambulatory Referral to Obstetrics / Gynecology    Belching    Hepatic steatosis    Other orders  -     Simethicone 80 MG tablet; Take 160 mg by mouth 3 (Three) Times a Day Before Meals.        I have discussed the above plan with the patient.  They verbalize understanding and are in agreement with the plan.  They have been advised to contact the office for any questions, concerns, or changes related to their health.    Dictated utilizing Dragon dictation

## 2018-12-19 NOTE — PATIENT INSTRUCTIONS
Schedule the ultrasound with Dr Vic Montero with meals three times per day    For any additional questions, concerns or changes to your condition after today's office visit please contact the office at [phone].

## 2019-01-21 ENCOUNTER — PROCEDURE VISIT (OUTPATIENT)
Dept: OBSTETRICS AND GYNECOLOGY | Age: 49
End: 2019-01-21

## 2019-01-21 ENCOUNTER — OFFICE VISIT (OUTPATIENT)
Dept: OBSTETRICS AND GYNECOLOGY | Age: 49
End: 2019-01-21

## 2019-01-21 VITALS
SYSTOLIC BLOOD PRESSURE: 110 MMHG | HEIGHT: 64 IN | BODY MASS INDEX: 26.63 KG/M2 | WEIGHT: 156 LBS | DIASTOLIC BLOOD PRESSURE: 68 MMHG

## 2019-01-21 DIAGNOSIS — R10.2 PELVIC PAIN: ICD-10-CM

## 2019-01-21 DIAGNOSIS — R10.31 RIGHT LOWER QUADRANT ABDOMINAL PAIN: Primary | ICD-10-CM

## 2019-01-21 DIAGNOSIS — R10.2 PELVIC PAIN: Primary | ICD-10-CM

## 2019-01-21 PROCEDURE — 99213 OFFICE O/P EST LOW 20 MIN: CPT | Performed by: OBSTETRICS & GYNECOLOGY

## 2019-01-21 PROCEDURE — 76830 TRANSVAGINAL US NON-OB: CPT | Performed by: OBSTETRICS & GYNECOLOGY

## 2019-01-21 NOTE — PROGRESS NOTES
"Subjective     Chief Complaint   Patient presents with   • Pelvic Pain     pt c/o pelvic pain, US today        Minnie Ortiz is a 48 y.o.  who presents to follow-up on pelvic pain.  The pain is like a tugging and pulling sensation in her right lower quadrant.  It is intermittent and also infrequent.  Her first episode she had in July and it was very severe.  It lasted about 1-2 days.  It then recurred in August and also lasted about 2 days.  Again in October and November she had 1 or 2 days of this pulling right lower quadrant pain.  During the month of November and December she had no further pain.  She notes that when she has this pain, it seems as though it makes her belch and act of belching relieves some of the pain.  She does still have regular, monthly menses, however it does not seem as though the pain comes at a certain point of her menstrual cycle.  It is not more likely to happen during menses.  She denies any associated nausea or vomiting, diarrhea.  It is not more likely to occur after eating.    She does have a history of a tubal, it was done vaginally and coils were placed in her tubes, she returned after three months for the Xray and her tubes were closed. This was approximately ten years ago.     The following portions of the patient's history were reviewed and updated as appropriate:vital signs, allergies, current medications, past medical history, past social history, past surgical history and problem list    Review of Systems- She denies nausea, vomiting, current pelvic pain, denies dyschezia, dyspareunia, dysuria    Objective      /68   Ht 162.6 cm (64\")   Wt 70.8 kg (156 lb)   LMP 2019 (Exact Date)   BMI 26.78 kg/m²     Physical Exam   Constitutional: She is oriented to person, place, and time. She appears well-developed and well-nourished.   Neck: No thyromegaly present.   Cardiovascular: Normal rate, regular rhythm and normal heart sounds.   Pulmonary/Chest: " Effort normal and breath sounds normal.   Abdominal: Soft. Bowel sounds are normal. There is no tenderness.   Of note, she had right pelvic tenderness when transvaginal sonogram was performed and visualizing right adnexa.   Neurological: She is alert and oriented to person, place, and time.   Skin: Skin is warm and dry. No erythema. No pallor.   Bimanual exam without evidence of pelvic floor spasm, no levator or obturator tenderness, no cervical motion tenderness, nonenlarged and nontender adnexa, no bladder or rectal tenderness.    Assessment/Plan     Problems Addressed this Visit        Nervous and Auditory    Pelvic pain    Right lower quadrant abdominal pain - Primary     She has been seen by gastroenterology with no apparent GI etiology of her pelvic pain. She does have a history of Essure coil placement, which itself has been associated with pelvic pain, but also she appears to have likely right hydrosalpinx which can also cause pelvic pain.  I discussed with her that the only apparent etiology at this time would be Essure coil and/or right sided hydrosalpinx. She does have tenderness of the right adnexa when sonogram was performed but not on bimanual exam today.  I offered to her laparoscopic salpingectomy and also Essure coil removal in order to treat her pelvic pain. As her pain is only intermittent, she desires to wait for a few months and see if pain recurs. If so and it is negatively impacting her function she may consider this option.     Return in about 3 months (around 4/21/2019) for GYN follow up.                 Perla Ho MD  1/24/2019

## 2019-01-21 NOTE — PROGRESS NOTES
US completed INA Ryan RN RDMS        Ultrasound Note     2019    Patient:  Minnie Ortiz      MR#:[id number]    48 y.o.   for GYN US    Patient Active Problem List   Diagnosis   • Other hyperlipidemia   • Vitamin D deficiency   • Encounter for medication monitoring   • Stress incontinence   • Pelvic pain   • Gastroesophageal reflux disease without esophagitis   • Strain of abdominal muscle   • Abdominal pain, epigastric   • Abdominal cramps   • Right lower quadrant abdominal pain   • Belching   • Hepatic steatosis       [See the scanned report in the media tab for more details]    Impression    1.  Normal size uterus: 7.62 x 5.70 x 4.33 cm and anteverted  2.  Endometrium: Thin at 4.40 mm and homogenous  3:  Myometrium: homogenous myometrium  4.  Ovaries Left: The left ovary appears normal and measures 2.58 x 2.34 x 1.72 cm, Right the right ovary also appears normal and measures 2.32 x 2.79 x 1.89 cm, however there is a cystic structure adjacent to the right ovary that may represent hydrosalpinx.  This area was tender when the sonogram was being performed.    Relevant comparison data available:  [x]  None          Perla Ho MD  2019 7:03 PM

## 2019-01-24 NOTE — ASSESSMENT & PLAN NOTE
She has been seen by gastroenterology with no apparent GI etiology of her pelvic pain. She does have a history of Essure coil placement, which itself has been associated with pelvic pain, but also she appears to have likely right hydrosalpinx which can also cause pelvic pain.  I discussed with her that the only apparent etiology at this time would be Essure coil and/or right sided hydrosalpinx. She does have tenderness of the right adnexa when sonogram was performed but not on bimanual exam today.  I offered to her laparoscopic salpingectomy and also Essure coil removal in order to treat her pelvic pain. As her pain is only intermittent, she desires to wait for a few months and see if pain recurs. If so and it is negatively impacting her function she may consider this option.     Return in about 3 months (around 4/21/2019) for GYN follow up.

## 2019-03-06 ENCOUNTER — RESULTS ENCOUNTER (OUTPATIENT)
Dept: INTERNAL MEDICINE | Facility: CLINIC | Age: 49
End: 2019-03-06

## 2019-03-06 DIAGNOSIS — E55.9 VITAMIN D DEFICIENCY: ICD-10-CM

## 2019-03-06 DIAGNOSIS — E78.49 OTHER HYPERLIPIDEMIA: ICD-10-CM

## 2019-03-06 DIAGNOSIS — Z51.81 ENCOUNTER FOR MEDICATION MONITORING: ICD-10-CM

## 2019-04-23 ENCOUNTER — OFFICE VISIT (OUTPATIENT)
Dept: INTERNAL MEDICINE | Facility: CLINIC | Age: 49
End: 2019-04-23

## 2019-04-23 VITALS
HEIGHT: 64 IN | WEIGHT: 154.6 LBS | OXYGEN SATURATION: 99 % | SYSTOLIC BLOOD PRESSURE: 104 MMHG | HEART RATE: 75 BPM | DIASTOLIC BLOOD PRESSURE: 72 MMHG | BODY MASS INDEX: 26.4 KG/M2

## 2019-04-23 DIAGNOSIS — K21.9 GASTROESOPHAGEAL REFLUX DISEASE WITHOUT ESOPHAGITIS: ICD-10-CM

## 2019-04-23 DIAGNOSIS — E55.9 VITAMIN D DEFICIENCY: ICD-10-CM

## 2019-04-23 DIAGNOSIS — J30.9 ALLERGIC RHINITIS, UNSPECIFIED SEASONALITY, UNSPECIFIED TRIGGER: ICD-10-CM

## 2019-04-23 DIAGNOSIS — K21.9 GASTROESOPHAGEAL REFLUX DISEASE WITHOUT ESOPHAGITIS: Primary | ICD-10-CM

## 2019-04-23 LAB — 25(OH)D3+25(OH)D2 SERPL-MCNC: 16.5 NG/ML (ref 30–100)

## 2019-04-23 PROCEDURE — 99214 OFFICE O/P EST MOD 30 MIN: CPT | Performed by: FAMILY MEDICINE

## 2019-04-23 RX ORDER — DEXLANSOPRAZOLE 60 MG/1
60 CAPSULE, DELAYED RELEASE ORAL DAILY
Qty: 30 CAPSULE | Refills: 6 | Status: SHIPPED | OUTPATIENT
Start: 2019-04-23 | End: 2019-04-23 | Stop reason: SDUPTHER

## 2019-04-23 RX ORDER — DEXLANSOPRAZOLE 60 MG/1
60 CAPSULE, DELAYED RELEASE ORAL DAILY
Qty: 30 CAPSULE | Refills: 6 | Status: SHIPPED | OUTPATIENT
Start: 2019-04-23 | End: 2019-09-13

## 2019-04-23 NOTE — PROGRESS NOTES
Subjective   Minnie Ortiz is a 48 y.o. female.     Chief Complaint   Patient presents with   • Follow-up     6 month,           History of Present Illness     Patient at today's visit following up on her acid reflux.  Patient states that she is been taking her Dexilant.  She none of the medications that he is working well for her.  She denies any side effects of the medication.  She notes that she is also made some lifestyle changes by avoiding spicy foods.    Patient also has allergic rhinitis.  Patient notes that she is currently not taking anything for this.  Patient notes that she does have congestion, and some sore throat from the allergies.  Occasionally her eyes do water.  She notes that this is worse than previous years.      Patient also has vitamin D deficiency.  Patient supposed be taking 5000 IUs of vitamin D daily.  Patient states that she has not been adherent to her medication regimen.        The following portions of the patient's history were reviewed and updated as appropriate: allergies, current medications, past family history, past medical history, past social history, past surgical history and problem list.    Review of Systems   Constitutional: Negative for chills and fever.   HENT: Positive for congestion and postnasal drip. Negative for rhinorrhea, sinus pain and sore throat.    Eyes: Negative for photophobia and visual disturbance.   Respiratory: Negative for cough, chest tightness and shortness of breath.    Cardiovascular: Negative for chest pain and palpitations.   Gastrointestinal: Negative for diarrhea, nausea and vomiting.   Genitourinary: Negative for dysuria, frequency and urgency.   Skin: Negative for rash and wound.   Neurological: Negative for dizziness and syncope.   Psychiatric/Behavioral: Negative for behavioral problems and confusion.       Objective   Physical Exam   Constitutional: She is oriented to person, place, and time. She appears well-developed and  well-nourished.   HENT:   Head: Normocephalic and atraumatic.   Right Ear: External ear normal.   Left Ear: External ear normal.   Eyes: EOM are normal.   Neck: Normal range of motion. Neck supple.   Cardiovascular: Normal rate, regular rhythm and normal heart sounds.   Pulmonary/Chest: Effort normal and breath sounds normal. No respiratory distress.   Musculoskeletal: Normal range of motion.   Lymphadenopathy:     She has no cervical adenopathy.   Neurological: She is alert and oriented to person, place, and time.   Skin: Skin is warm.   Psychiatric: She has a normal mood and affect. Her behavior is normal.   Nursing note and vitals reviewed.      Assessment/Plan   Minnie was seen today for follow-up.    Diagnoses and all orders for this visit:    Gastroesophageal reflux disease without esophagitis  -     dexlansoprazole (DEXILANT) 60 MG capsule; Take 1 capsule by mouth Daily.  -     Continue to avoid spicy foods.     Allergic rhinitis, unspecified seasonality, unspecified trigger        -     Continue dexilant.     Vitamin D deficiency  -     Vitamin D 25 Hydroxy  -     Continue vitamin D 5000 IU daily. Will check levels today.           No Follow-up on file.    Dictated utilizing Dragon Voice Recognition Software

## 2019-04-25 ENCOUNTER — TELEPHONE (OUTPATIENT)
Dept: INTERNAL MEDICINE | Facility: CLINIC | Age: 49
End: 2019-04-25

## 2019-04-25 NOTE — TELEPHONE ENCOUNTER
LVM- patient notified (ok per HIPAA). Patient advised to contact office if they have any questions.

## 2019-04-25 NOTE — TELEPHONE ENCOUNTER
----- Message from Ambrose Phillips MD sent at 4/24/2019  9:42 AM EDT -----  Please inform the patient of the following abnormal results.  Needs to do 5000 IU vitamin d daily.

## 2019-09-13 ENCOUNTER — OFFICE VISIT (OUTPATIENT)
Dept: INTERNAL MEDICINE | Facility: CLINIC | Age: 49
End: 2019-09-13

## 2019-09-13 VITALS
RESPIRATION RATE: 16 BRPM | OXYGEN SATURATION: 93 % | SYSTOLIC BLOOD PRESSURE: 96 MMHG | HEART RATE: 80 BPM | TEMPERATURE: 98.2 F | BODY MASS INDEX: 26.46 KG/M2 | WEIGHT: 155 LBS | HEIGHT: 64 IN | DIASTOLIC BLOOD PRESSURE: 68 MMHG

## 2019-09-13 DIAGNOSIS — R10.31 RIGHT LOWER QUADRANT ABDOMINAL PAIN: Primary | ICD-10-CM

## 2019-09-13 DIAGNOSIS — K21.9 GASTROESOPHAGEAL REFLUX DISEASE WITHOUT ESOPHAGITIS: ICD-10-CM

## 2019-09-13 DIAGNOSIS — R10.9 ABDOMINAL CRAMPS: ICD-10-CM

## 2019-09-13 LAB
BILIRUB BLD-MCNC: NEGATIVE MG/DL
CLARITY, POC: ABNORMAL
COLOR UR: ABNORMAL
GLUCOSE UR STRIP-MCNC: NEGATIVE MG/DL
KETONES UR QL: NEGATIVE
LEUKOCYTE EST, POC: NEGATIVE
NITRITE UR-MCNC: NEGATIVE MG/ML
PH UR: 6 [PH] (ref 5–8)
PROT UR STRIP-MCNC: ABNORMAL MG/DL
RBC # UR STRIP: ABNORMAL /UL
SP GR UR: 1.03 (ref 1–1.03)
UROBILINOGEN UR QL: NORMAL

## 2019-09-13 PROCEDURE — 99214 OFFICE O/P EST MOD 30 MIN: CPT | Performed by: FAMILY MEDICINE

## 2019-09-13 PROCEDURE — 81003 URINALYSIS AUTO W/O SCOPE: CPT | Performed by: FAMILY MEDICINE

## 2019-09-13 RX ORDER — RANITIDINE 150 MG/1
150 TABLET ORAL NIGHTLY
Qty: 30 TABLET | Refills: 6 | Status: SHIPPED | OUTPATIENT
Start: 2019-09-13 | End: 2023-01-09

## 2019-09-13 RX ORDER — DICYCLOMINE HYDROCHLORIDE 10 MG/1
10 CAPSULE ORAL 3 TIMES DAILY PRN
Qty: 30 CAPSULE | Refills: 3 | Status: SHIPPED | OUTPATIENT
Start: 2019-09-13 | End: 2019-10-11 | Stop reason: SDUPTHER

## 2019-09-13 NOTE — PATIENT INSTRUCTIONS
Exercising to Stay Healthy  To become healthy and stay healthy, it is recommended that you do moderate-intensity and vigorous-intensity exercise. You can tell that you are exercising at a moderate intensity if your heart starts beating faster and you start breathing faster but can still hold a conversation. You can tell that you are exercising at a vigorous intensity if you are breathing much harder and faster and cannot hold a conversation while exercising.  Exercising regularly is important. It has many health benefits, such as:  · Improving overall fitness, flexibility, and endurance.  · Increasing bone density.  · Helping with weight control.  · Decreasing body fat.  · Increasing muscle strength.  · Reducing stress and tension.  · Improving overall health.  How often should I exercise?  Choose an activity that you enjoy, and set realistic goals. Your health care provider can help you make an activity plan that works for you.  Exercise regularly as told by your health care provider. This may include:  · Doing strength training two times a week, such as:  ? Lifting weights.  ? Using resistance bands.  ? Push-ups.  ? Sit-ups.  ? Yoga.  · Doing a certain intensity of exercise for a given amount of time. Choose from these options:  ? A total of 150 minutes of moderate-intensity exercise every week.  ? A total of 75 minutes of vigorous-intensity exercise every week.  ? A mix of moderate-intensity and vigorous-intensity exercise every week.  Children, pregnant women, people who have not exercised regularly, people who are overweight, and older adults may need to talk with a health care provider about what activities are safe to do. If you have a medical condition, be sure to talk with your health care provider before you start a new exercise program.  What are some exercise ideas?  Moderate-intensity exercise ideas include:  · Walking 1 mile (1.6 km) in about 15  minutes.  · Biking.  · Hiking.  · Golfing.  · Dancing.  · Water aerobics.  Vigorous-intensity exercise ideas include:  · Walking 4.5 miles (7.2 km) or more in about 1 hour.  · Jogging or running 5 miles (8 km) in about 1 hour.  · Biking 10 miles (16.1 km) or more in about 1 hour.  · Lap swimming.  · Roller-skating or in-line skating.  · Cross-country skiing.  · Vigorous competitive sports, such as football, basketball, and soccer.  · Jumping rope.  · Aerobic dancing.  What are some everyday activities that can help me to get exercise?  · Yard work, such as:  ? Pushing a .  ? Raking and bagging leaves.  · Washing your car.  · Pushing a stroller.  · Shoveling snow.  · Gardening.  · Washing windows or floors.  How can I be more active in my day-to-day activities?  · Use stairs instead of an elevator.  · Take a walk during your lunch break.  · If you drive, park your car farther away from your work or school.  · If you take public transportation, get off one stop early and walk the rest of the way.  · Stand up or walk around during all of your indoor phone calls.  · Get up, stretch, and walk around every 30 minutes throughout the day.  · Enjoy exercise with a friend. Support to continue exercising will help you keep a regular routine of activity.  What guidelines can I follow while exercising?  · Before you start a new exercise program, talk with your health care provider.  · Do not exercise so much that you hurt yourself, feel dizzy, or get very short of breath.  · Wear comfortable clothes and wear shoes with good support.  · Drink plenty of water while you exercise to prevent dehydration or heat stroke.  · Work out until your breathing and your heartbeat get faster.  Where to find more information  · [place] Department of Health and Human Services: www.hhs.gov  · Centers for Disease Control and Prevention (CDC): www.cdc.gov  Summary  · Exercising regularly is important. It will improve your overall fitness,  flexibility, and endurance.  · Regular exercise also will improve your overall health. It can help you control your weight, reduce stress, and improve your bone density.  · Do not exercise so much that you hurt yourself, feel dizzy, or get very short of breath.  · Before you start a new exercise program, talk with your health care provider.  This information is not intended to replace advice given to you by your health care provider. Make sure you discuss any questions you have with your health care provider.  Document Released: 01/20/2012 Document Revised: 11/08/2018 Document Reviewed: 11/08/2018  [employer] Interactive Patient Education © 2019 [employer]  Calorie Counting for Weight Loss  Calories are units of energy. Your body needs a certain amount of calories from food to keep you going throughout the day. When you eat more calories than your body needs, your body stores the extra calories as fat. When you eat fewer calories than your body needs, your body burns fat to get the energy it needs.  Calorie counting means keeping track of how many calories you eat and drink each day. Calorie counting can be helpful if you need to lose weight. If you make sure to eat fewer calories than your body needs, you should lose weight. Ask your health care provider what a healthy weight is for you.  For calorie counting to work, you will need to eat the right number of calories in a day in order to lose a healthy amount of weight per week. A dietitian can help you determine how many calories you need in a day and will give you suggestions on how to reach your calorie goal.  · A healthy amount of weight to lose per week is usually 1-2 lb (0.5-0.9 kg). This usually means that your daily calorie intake should be reduced by 500-750 calories.  · Eating 1,200 - 1,500 calories per day can help most women lose weight.  · Eating 1,500 - 1,800 calories per day can help most men lose weight.  What is my plan?  My goal is to have  __________ calories per day.  If I have this many calories per day, I should lose around __________ pounds per week.  What do I need to know about calorie counting?  In order to meet your daily calorie goal, you will need to:  · Find out how many calories are in each food you would like to eat. Try to do this before you eat.  · Decide how much of the food you plan to eat.  · Write down what you ate and how many calories it had. Doing this is called keeping a food log.  To successfully lose weight, it is important to balance calorie counting with a healthy lifestyle that includes regular activity. Aim for 150 minutes of moderate exercise (such as walking) or 75 minutes of vigorous exercise (such as running) each week.  Where do I find calorie information?    The number of calories in a food can be found on a Nutrition Facts label. If a food does not have a Nutrition Facts label, try to look up the calories online or ask your dietitian for help.  Remember that calories are listed per serving. If you choose to have more than one serving of a food, you will have to multiply the calories per serving by the amount of servings you plan to eat. For example, the label on a package of bread might say that a serving size is 1 slice and that there are 90 calories in a serving. If you eat 1 slice, you will have eaten 90 calories. If you eat 2 slices, you will have eaten 180 calories.  How do I keep a food log?  Immediately after each meal, record the following information in your food log:  · What you ate. Don't forget to include toppings, sauces, and other extras on the food.  · How much you ate. This can be measured in cups, ounces, or number of items.  · How many calories each food and drink had.  · The total number of calories in the meal.  Keep your food log near you, such as in a small notebook in your pocket, or use a mobile mercedes or website. Some programs will calculate calories for you and show you how many calories you  "have left for the day to meet your goal.  What are some calorie counting tips?    · Use your calories on foods and drinks that will fill you up and not leave you hungry:  ? Some examples of foods that fill you up are nuts and nut butters, vegetables, lean proteins, and high-fiber foods like whole grains. High-fiber foods are foods with more than 5 g fiber per serving.  ? Drinks such as sodas, specialty coffee drinks, alcohol, and juices have a lot of calories, yet do not fill you up.  · Eat nutritious foods and avoid empty calories. Empty calories are calories you get from foods or beverages that do not have many vitamins or protein, such as candy, sweets, and soda. It is better to have a nutritious high-calorie food (such as an avocado) than a food with few nutrients (such as a bag of chips).  · Know how many calories are in the foods you eat most often. This will help you calculate calorie counts faster.  · Pay attention to calories in drinks. Low-calorie drinks include water and unsweetened drinks.  · Pay attention to nutrition labels for \"low fat\" or \"fat free\" foods. These foods sometimes have the same amount of calories or more calories than the full fat versions. They also often have added sugar, starch, or salt, to make up for flavor that was removed with the fat.  · Find a way of tracking calories that works for you. Get creative. Try different apps or programs if writing down calories does not work for you.  What are some portion control tips?  · Know how many calories are in a serving. This will help you know how many servings of a certain food you can have.  · Use a measuring cup to measure serving sizes. You could also try weighing out portions on a kitchen scale. With time, you will be able to estimate serving sizes for some foods.  · Take some time to put servings of different foods on your favorite plates, bowls, and cups so you know what a serving looks like.  · Try not to eat straight from a bag or " box. Doing this can lead to overeating. Put the amount you would like to eat in a cup or on a plate to make sure you are eating the right portion.  · Use smaller plates, glasses, and bowls to prevent overeating.  · Try not to multitask (for example, watch TV or use your computer) while eating. If it is time to eat, sit down at a table and enjoy your food. This will help you to know when you are full. It will also help you to be aware of what you are eating and how much you are eating.  What are tips for following this plan?  Reading food labels  · Check the calorie count compared to the serving size. The serving size may be smaller than what you are used to eating.  · Check the source of the calories. Make sure the food you are eating is high in vitamins and protein and low in saturated and trans fats.  Shopping  · Read nutrition labels while you shop. This will help you make healthy decisions before you decide to purchase your food.  · Make a grocery list and stick to it.  Cooking  · Try to cook your favorite foods in a healthier way. For example, try baking instead of frying.  · Use low-fat dairy products.  Meal planning  · Use more fruits and vegetables. Half of your plate should be fruits and vegetables.  · Include lean proteins like poultry and fish.  How do I count calories when eating out?  · Ask for smaller portion sizes.  · Consider sharing an entree and sides instead of getting your own entree.  · If you get your own entree, eat only half. Ask for a box at the beginning of your meal and put the rest of your entree in it so you are not tempted to eat it.  · If calories are listed on the menu, choose the lower calorie options.  · Choose dishes that include vegetables, fruits, whole grains, low-fat dairy products, and lean protein.  · Choose items that are boiled, broiled, grilled, or steamed. Stay away from items that are buttered, battered, fried, or served with cream sauce. Items labeled “crispy” are  usually fried, unless stated otherwise.  · Choose water, low-fat milk, unsweetened iced tea, or other drinks without added sugar. If you want an alcoholic beverage, choose a lower calorie option such as a glass of wine or light beer.  · Ask for dressings, sauces, and syrups on the side. These are usually high in calories, so you should limit the amount you eat.  · If you want a salad, choose a garden salad and ask for grilled meats. Avoid extra toppings like doe, cheese, or fried items. Ask for the dressing on the side, or ask for olive oil and vinegar or lemon to use as dressing.  · Estimate how many servings of a food you are given. For example, a serving of cooked rice is ½ cup or about the size of half a baseball. Knowing serving sizes will help you be aware of how much food you are eating at restaurants. The list below tells you how big or small some common portion sizes are based on everyday objects:  ? 1 oz--4 stacked dice.  ? 3 oz--1 deck of cards.  ? 1 tsp--1 die.  ? 1 Tbsp--½ a ping-pong ball.  ? 2 Tbsp--1 ping-pong ball.  ? ½ cup--½ baseball.  ? 1 cup--1 baseball.  Summary  · Calorie counting means keeping track of how many calories you eat and drink each day. If you eat fewer calories than your body needs, you should lose weight.  · A healthy amount of weight to lose per week is usually 1-2 lb (0.5-0.9 kg). This usually means reducing your daily calorie intake by 500-750 calories.  · The number of calories in a food can be found on a Nutrition Facts label. If a food does not have a Nutrition Facts label, try to look up the calories online or ask your dietitian for help.  · Use your calories on foods and drinks that will fill you up, and not on foods and drinks that will leave you hungry.  · Use smaller plates, glasses, and bowls to prevent overeating.  This information is not intended to replace advice given to you by your health care provider. Make sure you discuss any questions you have with your  "health care provider.  Document Released: 12/18/2006 Document Revised: 11/17/2017 Document Reviewed: 11/17/2017  [employer] Interactive Patient Education © 2019 [employer]  BMI for Adults    Body mass index (BMI) is a number that is calculated from a person's weight and height. BMI may help to estimate how much of a person's weight is composed of fat. BMI can help identify those who may be at higher risk for certain medical problems.  How is BMI used with adults?  BMI is used as a screening tool to identify possible weight problems. It is used to check whether a person is obese, overweight, healthy weight, or underweight.  How is BMI calculated?  BMI measures your weight and compares it to your height. This can be done either in English ([place] or metric measurements. Note that charts are available to help you find your BMI quickly and easily without having to do these calculations yourself.  To calculate your BMI in English ([place] measurements, your health care provider will:  1. Measure your weight in pounds (lb).  2. Multiply the number of pounds by 703.  ? For example, for a person who weighs 180 lb, multiply that number by 703, which equals 126,540.  3. Measure your height in inches (in). Then multiply that number by itself to get a measurement called \"inches squared.\"  ? For example, for a person who is 70 in tall, the \"inches squared\" measurement is 70 in x 70 in, which equals 4900 inches squared.  4. Divide the total from Step 2 (number of lb x 703) by the total from Step 3 (inches squared): 126,540 ÷ 4900 = 25.8. This is your BMI.  To calculate your BMI in metric measurements, your health care provider will:  1. Measure your weight in kilograms (kg).  2. Measure your height in meters (m). Then multiply that number by itself to get a measurement called \"meters squared.\"  ? For example, for a person who is 1.75 m tall, the \"meters squared\" measurement is 1.75 m x 1.75 m, which is equal to 3.1 meters " squared.  3. Divide the number of kilograms (your weight) by the meters squared number. In this example: 70 ÷ 3.1 = 22.6. This is your BMI.  How is BMI interpreted?  To interpret your results, your health care provider will use BMI charts to identify whether you are underweight, normal weight, overweight, or obese. The following guidelines will be used:  · Underweight: BMI less than 18.5.  · Normal weight: BMI between 18.5 and 24.9.  · Overweight: BMI between 25 and 29.9.  · Obese: BMI of 30 and above.  Please note:  · Weight includes both fat and muscle, so someone with a muscular build, such as an athlete, may have a BMI that is higher than 24.9. In cases like these, BMI is not an accurate measure of body fat.  · To determine if excess body fat is the cause of a BMI of 25 or higher, further assessments may need to be done by a health care provider.  · BMI is usually interpreted in the same way for men and women.  Why is BMI a useful tool?  BMI is useful in two ways:  · Identifying a weight problem that may be related to a medical condition, or that may increase the risk for medical problems.  · Promoting lifestyle and diet changes in order to reach a healthy weight.  Summary  · Body mass index (BMI) is a number that is calculated from a person's weight and height.  · BMI may help to estimate how much of a person's weight is composed of fat. BMI can help identify those who may be at higher risk for certain medical problems.  · BMI can be measured using English measurements or metric measurements.  · To interpret your results, your health care provider will use BMI charts to identify whether you are underweight, normal weight, overweight, or obese.  This information is not intended to replace advice given to you by your health care provider. Make sure you discuss any questions you have with your health care provider.  Document Released: 08/29/2005 Document Revised: 10/31/2018 Document Reviewed: 10/31/2018  [employer]  Interactive Patient Education © 2019 [employer]

## 2019-09-14 LAB
ALBUMIN SERPL-MCNC: 4.6 G/DL (ref 3.5–5.2)
ALBUMIN/GLOB SERPL: 1.5 G/DL
ALP SERPL-CCNC: 61 U/L (ref 39–117)
ALT SERPL-CCNC: 15 U/L (ref 1–33)
AST SERPL-CCNC: 9 U/L (ref 1–32)
BASOPHILS # BLD AUTO: 0.03 10*3/MM3 (ref 0–0.2)
BASOPHILS NFR BLD AUTO: 0.4 % (ref 0–1.5)
BILIRUB SERPL-MCNC: 0.4 MG/DL (ref 0.2–1.2)
BUN SERPL-MCNC: 9 MG/DL (ref 6–20)
BUN/CREAT SERPL: 12.7 (ref 7–25)
CALCIUM SERPL-MCNC: 9 MG/DL (ref 8.6–10.5)
CHLORIDE SERPL-SCNC: 103 MMOL/L (ref 98–107)
CO2 SERPL-SCNC: 22.7 MMOL/L (ref 22–29)
CREAT SERPL-MCNC: 0.71 MG/DL (ref 0.57–1)
EOSINOPHIL # BLD AUTO: 0.07 10*3/MM3 (ref 0–0.4)
EOSINOPHIL NFR BLD AUTO: 0.8 % (ref 0.3–6.2)
ERYTHROCYTE [DISTWIDTH] IN BLOOD BY AUTOMATED COUNT: 13.9 % (ref 12.3–15.4)
GLOBULIN SER CALC-MCNC: 3.1 GM/DL
GLUCOSE SERPL-MCNC: 105 MG/DL (ref 65–99)
HCT VFR BLD AUTO: 42 % (ref 34–46.6)
HGB BLD-MCNC: 13.2 G/DL (ref 12–15.9)
IMM GRANULOCYTES # BLD AUTO: 0.03 10*3/MM3 (ref 0–0.05)
IMM GRANULOCYTES NFR BLD AUTO: 0.4 % (ref 0–0.5)
LYMPHOCYTES # BLD AUTO: 2.31 10*3/MM3 (ref 0.7–3.1)
LYMPHOCYTES NFR BLD AUTO: 27.2 % (ref 19.6–45.3)
MCH RBC QN AUTO: 27.4 PG (ref 26.6–33)
MCHC RBC AUTO-ENTMCNC: 31.4 G/DL (ref 31.5–35.7)
MCV RBC AUTO: 87.1 FL (ref 79–97)
MONOCYTES # BLD AUTO: 0.48 10*3/MM3 (ref 0.1–0.9)
MONOCYTES NFR BLD AUTO: 5.7 % (ref 5–12)
NEUTROPHILS # BLD AUTO: 5.56 10*3/MM3 (ref 1.7–7)
NEUTROPHILS NFR BLD AUTO: 65.5 % (ref 42.7–76)
NRBC BLD AUTO-RTO: 0 /100 WBC (ref 0–0.2)
PLATELET # BLD AUTO: 334 10*3/MM3 (ref 140–450)
POTASSIUM SERPL-SCNC: 4 MMOL/L (ref 3.5–5.2)
PROT SERPL-MCNC: 7.7 G/DL (ref 6–8.5)
RBC # BLD AUTO: 4.82 10*6/MM3 (ref 3.77–5.28)
SODIUM SERPL-SCNC: 139 MMOL/L (ref 136–145)
WBC # BLD AUTO: 8.48 10*3/MM3 (ref 3.4–10.8)

## 2019-09-14 NOTE — PROGRESS NOTES
Subjective   Minnie Ortiz is a 48 y.o. female.     Chief Complaint   Patient presents with   • Abdominal Pain   • Headache         History of Present Illness     States that she continues to have right lower quadrant pain.  Patient states that she has had this going on for quite some time.  Patient has had multiple tests in the past, and they have not been any definitive findings.  Patient is even seen GI.  She had been put on Bentyl as well as simethicone  tablets.  Patient states that she is no longer taking the Dexilant for her GERD.  States that the signs and symptoms are most likely related to right lower quadrant pain.  Patient states that she recently went to [facility] back in July, she stopped taking the medications prior to that, even though that she was experiencing some pain.  Patient states that she had a CT scan done in [facility], which she was told that everything was fine.    The following portions of the patient's history were reviewed and updated as appropriate: allergies, current medications, past family history, past medical history, past social history, past surgical history and problem list.    Review of Systems   Constitutional: Negative for chills and fever.   HENT: Negative for congestion, rhinorrhea, sinus pain and sore throat.    Eyes: Negative for photophobia and visual disturbance.   Respiratory: Negative for cough, chest tightness and shortness of breath.    Cardiovascular: Negative for chest pain and palpitations.   Gastrointestinal: Negative for diarrhea, nausea and vomiting.   Genitourinary: Negative for dysuria, frequency and urgency.   Skin: Negative for rash and wound.   Neurological: Negative for dizziness and syncope.   Psychiatric/Behavioral: Negative for behavioral problems and confusion.       Objective   Physical Exam   Constitutional: She is oriented to person, place, and time. She appears well-developed and well-nourished.   HENT:   Head: Normocephalic and atraumatic.    Right Ear: External ear normal.   Left Ear: External ear normal.   Mouth/Throat: Oropharynx is clear and moist.   Eyes: EOM are normal.   Neck: Normal range of motion. Neck supple.   Cardiovascular: Normal rate, regular rhythm and normal heart sounds.   Pulmonary/Chest: Effort normal and breath sounds normal. No respiratory distress.   Abdominal: Soft. There is tenderness. There is no rebound and no guarding.       Musculoskeletal: Normal range of motion.   Lymphadenopathy:     She has no cervical adenopathy.   Neurological: She is alert and oriented to person, place, and time.   Skin: Skin is warm.   Psychiatric: She has a normal mood and affect. Her behavior is normal.   Nursing note and vitals reviewed.      Assessment/Plan   Minnie was seen today for abdominal pain and headache.    Diagnoses and all orders for this visit:    Right lower quadrant abdominal pain  -     Comprehensive Metabolic Panel  -     CBC & Differential  -     POCT urinalysis dipstick, automated  -     May benefit from going back to seeing her GI.  Will order some her some labs at today's office visit.  Pain in the right lower quadrant has been chronic this point.  There is no signs or symptoms at today's visit that may be concerning for an acute abdomen.    Abdominal cramps  -     Simethicone 80 MG tablet; Take 160 mg by mouth 3 (Three) Times a Day Before Meals.  -     dicyclomine (BENTYL) 10 MG capsule; Take 1 capsule by mouth 3 (Three) Times a Day As Needed (abdominal cramps).  -     Recommended to get back on medication, and continue the simethicone and the Bentyl.    Gastroesophageal reflux disease without esophagitis  -     raNITIdine (ZANTAC) 150 MG tablet; Take 1 tablet by mouth Every Night.  -     We will start patient on Zantac.          No Follow-up on file.    Dictated utilizing Dragon Voice Recognition Software

## 2019-09-15 NOTE — PROGRESS NOTES
The labs were reviewed. Please inform patient that labs were normal.  Nothing abnormal in the labs that would justify patients pain. She has a GI, and will need to call and set up an appointment.

## 2019-10-11 ENCOUNTER — APPOINTMENT (OUTPATIENT)
Dept: CT IMAGING | Facility: HOSPITAL | Age: 49
End: 2019-10-11

## 2019-10-11 ENCOUNTER — TELEPHONE (OUTPATIENT)
Dept: INTERNAL MEDICINE | Facility: CLINIC | Age: 49
End: 2019-10-11

## 2019-10-11 ENCOUNTER — OFFICE VISIT (OUTPATIENT)
Dept: INTERNAL MEDICINE | Facility: CLINIC | Age: 49
End: 2019-10-11

## 2019-10-11 VITALS
HEART RATE: 76 BPM | RESPIRATION RATE: 14 BRPM | OXYGEN SATURATION: 99 % | HEIGHT: 64 IN | WEIGHT: 156.6 LBS | BODY MASS INDEX: 26.73 KG/M2 | DIASTOLIC BLOOD PRESSURE: 66 MMHG | SYSTOLIC BLOOD PRESSURE: 100 MMHG

## 2019-10-11 DIAGNOSIS — R10.9 ABDOMINAL CRAMPS: ICD-10-CM

## 2019-10-11 DIAGNOSIS — R10.31 RIGHT LOWER QUADRANT ABDOMINAL PAIN: Primary | ICD-10-CM

## 2019-10-11 PROCEDURE — 99214 OFFICE O/P EST MOD 30 MIN: CPT | Performed by: FAMILY MEDICINE

## 2019-10-11 RX ORDER — DICYCLOMINE HYDROCHLORIDE 10 MG/1
10 CAPSULE ORAL 3 TIMES DAILY PRN
Qty: 30 CAPSULE | Refills: 3 | Status: SHIPPED | OUTPATIENT
Start: 2019-10-11 | End: 2023-01-09

## 2019-10-11 NOTE — TELEPHONE ENCOUNTER
Dr. Power called stating that the CT of the abdomen showed that the appendix is normal, fatty change in the liver, but otherwise normal.  Dr. Phillips notified.  [facility] did release patient to go home.  Please advise.

## 2019-10-11 NOTE — PROGRESS NOTES
Subjective   Minnie Ortiz is a 48 y.o. female.     Chief Complaint   Patient presents with   • Follow-up     2 week  abdominal pain no change         History of Present Illness     Patient presents today with right lower quadrant pain.  Patient has had this intermittent pain is been going on for quite some time now.  This is been going on for few months, and has even gone back to some point last year.  Lastly she has had a CT scan of her abdomen which was normal.  Patient states the back in July when she was in the ER she had a another CT scan of her abdomen, which showed that the pain could be related to her appendix, even though there was no inflammation around it.  There is no other acute findings on the CT scan.  At today's office visit patient states that the pain is getting worse.  She is tender on the right lower quadrant.  She does not have any fevers.  Patient does not have any problems with her bowel movements.  Patient has been taking simethicone and Bentyl for her abdominal cramps, but notes that did not really helping.  Patient later states that the pain can be worse during the time of her, Especially when her periods are ending.  Patient has not seen her gynecologist at this time.    The following portions of the patient's history were reviewed and updated as appropriate: allergies, current medications, past family history, past medical history, past social history, past surgical history and problem list.    Review of Systems   Constitutional: Negative for chills and fever.   HENT: Negative for congestion, rhinorrhea, sinus pain and sore throat.    Eyes: Negative for photophobia and visual disturbance.   Respiratory: Negative for cough, chest tightness and shortness of breath.    Cardiovascular: Negative for chest pain and palpitations.   Gastrointestinal: Positive for abdominal pain. Negative for diarrhea, nausea and vomiting.   Genitourinary: Negative for dysuria, frequency and urgency.    Skin: Negative for rash and wound.   Neurological: Negative for dizziness and syncope.   Psychiatric/Behavioral: Negative for behavioral problems and confusion.       Objective   Physical Exam   Constitutional: She is oriented to person, place, and time. She appears well-developed and well-nourished.   HENT:   Head: Normocephalic and atraumatic.   Right Ear: External ear normal.   Left Ear: External ear normal.   Mouth/Throat: Oropharynx is clear and moist.   Eyes: EOM are normal.   Neck: Normal range of motion. Neck supple.   Cardiovascular: Normal rate, regular rhythm and normal heart sounds.   Pulmonary/Chest: Effort normal and breath sounds normal. No respiratory distress.   Abdominal: Soft. There is tenderness. There is no guarding.   Musculoskeletal: Normal range of motion.   Lymphadenopathy:     She has no cervical adenopathy.   Neurological: She is alert and oriented to person, place, and time.   Skin: Skin is warm.   Psychiatric: She has a normal mood and affect. Her behavior is normal.   Nursing note and vitals reviewed.      Assessment/Plan   Minnie was seen today for follow-up.    Diagnoses and all orders for this visit:    Right lower quadrant abdominal pain  -     Ambulatory Referral to Gastroenterology  -     CBC & Differential  -     Comprehensive Metabolic Panel  -     I have discussed with patient that we will get a stat CT of her abdomen and pelvis.  Discussed with her if signs and symptoms worsen, she is to go to the emergency room.  Patient would benefit from having a GI consult.  At the present time I do not believe that this is an acute abdomen, however will get a CT scan to see if it is any other findings.  As patient's pain is been going on chronically as well as intermittently.  Patient is frustrated at this time, as she does not have any answers for the cause of her pain.    Abdominal cramps  -     Simethicone 80 MG tablet; Take 160 mg by mouth 3 (Three) Times a Day Before Meals.  -      dicyclomine (BENTYL) 10 MG capsule; Take 1 capsule by mouth 3 (Three) Times a Day As Needed (abdominal cramps).  -     CT Abdomen Pelvis With Contrast          No Follow-up on file.    Dictated utilizing Dragon Voice Recognition Software

## 2019-10-22 ENCOUNTER — OFFICE (AMBULATORY)
Dept: URBAN - METROPOLITAN AREA CLINIC 64 | Facility: CLINIC | Age: 49
End: 2019-10-22

## 2019-10-22 VITALS
SYSTOLIC BLOOD PRESSURE: 119 MMHG | HEIGHT: 64 IN | HEART RATE: 73 BPM | WEIGHT: 161 LBS | DIASTOLIC BLOOD PRESSURE: 81 MMHG

## 2019-10-22 DIAGNOSIS — R10.31 RIGHT LOWER QUADRANT PAIN: ICD-10-CM

## 2019-10-22 PROCEDURE — 99243 OFF/OP CNSLTJ NEW/EST LOW 30: CPT | Performed by: INTERNAL MEDICINE

## 2019-11-19 ENCOUNTER — ON CAMPUS - OUTPATIENT (AMBULATORY)
Dept: URBAN - METROPOLITAN AREA HOSPITAL 2 | Facility: HOSPITAL | Age: 49
End: 2019-11-19

## 2019-11-19 VITALS
SYSTOLIC BLOOD PRESSURE: 101 MMHG | TEMPERATURE: 97.5 F | OXYGEN SATURATION: 97 % | SYSTOLIC BLOOD PRESSURE: 97 MMHG | WEIGHT: 151 LBS | DIASTOLIC BLOOD PRESSURE: 67 MMHG | DIASTOLIC BLOOD PRESSURE: 50 MMHG | RESPIRATION RATE: 18 BRPM | RESPIRATION RATE: 16 BRPM | DIASTOLIC BLOOD PRESSURE: 49 MMHG | SYSTOLIC BLOOD PRESSURE: 90 MMHG | HEART RATE: 68 BPM | SYSTOLIC BLOOD PRESSURE: 99 MMHG | HEART RATE: 65 BPM | HEART RATE: 83 BPM | SYSTOLIC BLOOD PRESSURE: 86 MMHG | DIASTOLIC BLOOD PRESSURE: 56 MMHG | HEIGHT: 64 IN | HEART RATE: 81 BPM | HEART RATE: 78 BPM | SYSTOLIC BLOOD PRESSURE: 89 MMHG | HEART RATE: 77 BPM | SYSTOLIC BLOOD PRESSURE: 100 MMHG | HEART RATE: 71 BPM | DIASTOLIC BLOOD PRESSURE: 64 MMHG | HEART RATE: 72 BPM | DIASTOLIC BLOOD PRESSURE: 65 MMHG | OXYGEN SATURATION: 99 % | OXYGEN SATURATION: 100 % | DIASTOLIC BLOOD PRESSURE: 57 MMHG

## 2019-11-19 DIAGNOSIS — R10.31 RIGHT LOWER QUADRANT PAIN: ICD-10-CM

## 2019-11-19 DIAGNOSIS — K64.1 SECOND DEGREE HEMORRHOIDS: ICD-10-CM

## 2019-11-19 DIAGNOSIS — R14.2 ERUCTATION: ICD-10-CM

## 2019-11-19 DIAGNOSIS — K21.0 GASTRO-ESOPHAGEAL REFLUX DISEASE WITH ESOPHAGITIS: ICD-10-CM

## 2019-11-19 PROBLEM — K20.9 ESOPHAGITIS, UNSPECIFIED: Status: ACTIVE | Noted: 2019-11-19

## 2019-11-19 PROBLEM — K21.9 GASTROESOPHAGEAL REFLUX DISEASE: Status: ACTIVE | Noted: 2019-11-19

## 2019-11-19 PROCEDURE — 43235 EGD DIAGNOSTIC BRUSH WASH: CPT | Performed by: INTERNAL MEDICINE

## 2019-11-19 PROCEDURE — 45378 DIAGNOSTIC COLONOSCOPY: CPT | Performed by: INTERNAL MEDICINE

## 2019-11-22 ENCOUNTER — TELEPHONE (OUTPATIENT)
Dept: INTERNAL MEDICINE | Facility: CLINIC | Age: 49
End: 2019-11-22

## 2019-11-22 NOTE — TELEPHONE ENCOUNTER
Patient called stating she went to GI and did testing. They told her everything was fine but she's still having pain and no medication has helped her. Patient is wondering if she has endometriosis?

## 2021-05-03 ENCOUNTER — LAB (OUTPATIENT)
Dept: LAB | Facility: HOSPITAL | Age: 51
End: 2021-05-03

## 2021-05-03 ENCOUNTER — OFFICE VISIT (OUTPATIENT)
Dept: INTERNAL MEDICINE | Facility: CLINIC | Age: 51
End: 2021-05-03

## 2021-05-03 VITALS
BODY MASS INDEX: 25.58 KG/M2 | SYSTOLIC BLOOD PRESSURE: 100 MMHG | DIASTOLIC BLOOD PRESSURE: 64 MMHG | RESPIRATION RATE: 16 BRPM | WEIGHT: 149 LBS | HEART RATE: 76 BPM | OXYGEN SATURATION: 99 %

## 2021-05-03 DIAGNOSIS — L23.9 ALLERGIC CONTACT DERMATITIS, UNSPECIFIED TRIGGER: Primary | ICD-10-CM

## 2021-05-03 LAB
ALBUMIN SERPL-MCNC: 4.1 G/DL (ref 3.5–5.2)
ALBUMIN/GLOB SERPL: 1.4 G/DL
ALP SERPL-CCNC: 54 U/L (ref 39–117)
ALT SERPL W P-5'-P-CCNC: 15 U/L (ref 1–33)
ANION GAP SERPL CALCULATED.3IONS-SCNC: 13.7 MMOL/L (ref 5–15)
AST SERPL-CCNC: 13 U/L (ref 1–32)
BASOPHILS # BLD AUTO: 0.05 10*3/MM3 (ref 0–0.2)
BASOPHILS NFR BLD AUTO: 0.6 % (ref 0–1.5)
BILIRUB SERPL-MCNC: 0.5 MG/DL (ref 0–1.2)
BUN SERPL-MCNC: 10 MG/DL (ref 6–20)
BUN/CREAT SERPL: 17.5 (ref 7–25)
CALCIUM SPEC-SCNC: 8.9 MG/DL (ref 8.6–10.5)
CHLORIDE SERPL-SCNC: 105 MMOL/L (ref 98–107)
CO2 SERPL-SCNC: 20.3 MMOL/L (ref 22–29)
CREAT SERPL-MCNC: 0.57 MG/DL (ref 0.57–1)
CRP SERPL-MCNC: <0.3 MG/DL (ref 0–0.5)
DEPRECATED RDW RBC AUTO: 38.2 FL (ref 37–54)
EOSINOPHIL # BLD AUTO: 0.19 10*3/MM3 (ref 0–0.4)
EOSINOPHIL NFR BLD AUTO: 2.4 % (ref 0.3–6.2)
ERYTHROCYTE [DISTWIDTH] IN BLOOD BY AUTOMATED COUNT: 12.6 % (ref 12.3–15.4)
ERYTHROCYTE [SEDIMENTATION RATE] IN BLOOD: 12 MM/HR (ref 0–20)
GFR SERPL CREATININE-BSD FRML MDRD: 113 ML/MIN/1.73
GFR SERPL CREATININE-BSD FRML MDRD: 137 ML/MIN/1.73
GLOBULIN UR ELPH-MCNC: 3 GM/DL
GLUCOSE SERPL-MCNC: 93 MG/DL (ref 65–99)
HCT VFR BLD AUTO: 39 % (ref 34–46.6)
HGB BLD-MCNC: 12.8 G/DL (ref 12–15.9)
IMM GRANULOCYTES # BLD AUTO: 0.03 10*3/MM3 (ref 0–0.05)
IMM GRANULOCYTES NFR BLD AUTO: 0.4 % (ref 0–0.5)
LYMPHOCYTES # BLD AUTO: 2.2 10*3/MM3 (ref 0.7–3.1)
LYMPHOCYTES NFR BLD AUTO: 28.3 % (ref 19.6–45.3)
MCH RBC QN AUTO: 27.8 PG (ref 26.6–33)
MCHC RBC AUTO-ENTMCNC: 32.8 G/DL (ref 31.5–35.7)
MCV RBC AUTO: 84.8 FL (ref 79–97)
MONOCYTES # BLD AUTO: 0.44 10*3/MM3 (ref 0.1–0.9)
MONOCYTES NFR BLD AUTO: 5.7 % (ref 5–12)
NEUTROPHILS NFR BLD AUTO: 4.87 10*3/MM3 (ref 1.7–7)
NEUTROPHILS NFR BLD AUTO: 62.6 % (ref 42.7–76)
NRBC BLD AUTO-RTO: 0 /100 WBC (ref 0–0.2)
PLATELET # BLD AUTO: 329 10*3/MM3 (ref 140–450)
PMV BLD AUTO: 10.5 FL (ref 6–12)
POTASSIUM SERPL-SCNC: 3.9 MMOL/L (ref 3.5–5.2)
PROT SERPL-MCNC: 7.1 G/DL (ref 6–8.5)
RBC # BLD AUTO: 4.6 10*6/MM3 (ref 3.77–5.28)
SODIUM SERPL-SCNC: 139 MMOL/L (ref 136–145)
WBC # BLD AUTO: 7.78 10*3/MM3 (ref 3.4–10.8)

## 2021-05-03 PROCEDURE — 36415 COLL VENOUS BLD VENIPUNCTURE: CPT | Performed by: FAMILY MEDICINE

## 2021-05-03 PROCEDURE — 99213 OFFICE O/P EST LOW 20 MIN: CPT | Performed by: FAMILY MEDICINE

## 2021-05-03 PROCEDURE — 85025 COMPLETE CBC W/AUTO DIFF WBC: CPT | Performed by: FAMILY MEDICINE

## 2021-05-03 PROCEDURE — 80053 COMPREHEN METABOLIC PANEL: CPT | Performed by: FAMILY MEDICINE

## 2021-05-03 PROCEDURE — 85652 RBC SED RATE AUTOMATED: CPT | Performed by: FAMILY MEDICINE

## 2021-05-03 PROCEDURE — 86140 C-REACTIVE PROTEIN: CPT | Performed by: FAMILY MEDICINE

## 2021-06-28 ENCOUNTER — TELEPHONE (OUTPATIENT)
Dept: INTERNAL MEDICINE | Facility: CLINIC | Age: 51
End: 2021-06-28

## 2021-06-28 DIAGNOSIS — R10.9 ABDOMINAL CRAMPS: Primary | ICD-10-CM

## 2021-06-28 NOTE — TELEPHONE ENCOUNTER
PATIENT IS HAVING STOMACH PAINS AGAIN. SHE WAS IN TEARS. SHE IS WANTING TO KNOW IF ANYTHING CAN BE CALLED IN FOR HER.    PLEASE ADVISE  [phone]

## 2021-06-28 NOTE — TELEPHONE ENCOUNTER
Lets order a CT scan of the abdomen pelvis.  With contrast.  I cannot prescribe her any pain medication until I see what is going on.  She does need to be evaluated.  However I would like to get the CT scan before seeing her.  Going to the emergency room would be her best option where she can receive pain medicine as well as getting the imaging done.  It is really hard to know what exactly was being treated without getting a more clinical picture.

## 2021-06-28 NOTE — TELEPHONE ENCOUNTER
We have not been able to find out the cause of her stomach pains in numerous evaluations. But if she is in this much pain and in tears, have her get a work up done in the ER. She can follow up with me later in the week.

## 2022-10-06 ENCOUNTER — OFFICE VISIT (OUTPATIENT)
Dept: INTERNAL MEDICINE | Facility: CLINIC | Age: 52
End: 2022-10-06

## 2022-10-06 ENCOUNTER — HOSPITAL ENCOUNTER (OUTPATIENT)
Dept: GENERAL RADIOLOGY | Facility: HOSPITAL | Age: 52
Discharge: HOME | End: 2022-10-06
Admitting: FAMILY MEDICINE

## 2022-10-06 ENCOUNTER — TELEPHONE (OUTPATIENT)
Dept: GASTROENTEROLOGY | Facility: CLINIC | Age: 52
End: 2022-10-06

## 2022-10-06 VITALS
TEMPERATURE: 97.3 F | RESPIRATION RATE: 18 BRPM | HEIGHT: 64 IN | WEIGHT: 157.2 LBS | HEART RATE: 71 BPM | BODY MASS INDEX: 26.84 KG/M2 | OXYGEN SATURATION: 98 % | DIASTOLIC BLOOD PRESSURE: 80 MMHG | SYSTOLIC BLOOD PRESSURE: 118 MMHG

## 2022-10-06 DIAGNOSIS — Z00.00 WELL WOMAN EXAM (NO GYNECOLOGICAL EXAM): Primary | ICD-10-CM

## 2022-10-06 DIAGNOSIS — M25.561 ACUTE PAIN OF RIGHT KNEE: ICD-10-CM

## 2022-10-06 DIAGNOSIS — Z00.00 HEALTHCARE MAINTENANCE: ICD-10-CM

## 2022-10-06 DIAGNOSIS — Z12.11 SCREEN FOR COLON CANCER: ICD-10-CM

## 2022-10-06 DIAGNOSIS — Z12.31 ENCOUNTER FOR SCREENING MAMMOGRAM FOR MALIGNANT NEOPLASM OF BREAST: ICD-10-CM

## 2022-10-06 PROCEDURE — 99396 PREV VISIT EST AGE 40-64: CPT | Performed by: FAMILY MEDICINE

## 2022-10-06 PROCEDURE — 73562 X-RAY EXAM OF KNEE 3: CPT

## 2022-10-06 PROCEDURE — 99213 OFFICE O/P EST LOW 20 MIN: CPT | Performed by: FAMILY MEDICINE

## 2022-10-06 NOTE — PROGRESS NOTES
"Chief Complaint  Hyperlipidemia    Subjective        Minnie Ortiz presents to [facility]  History of Present Illness    The patient having is right knee pain is been going on for 2 months now.  She denies doing anything for this.  She is currently not taking any medication.  She denies any trauma to the area.  Patient states the pain is worse when she is going up and down the stairs.    Objective   Vital Signs:  /80   Pulse 71   Temp 97.3 °F (36.3 °C)   Resp 18   Ht 162.6 cm (64\")   Wt 71.3 kg (157 lb 3.2 oz)   SpO2 98%   BMI 26.98 kg/m²   Estimated body mass index is 26.98 kg/m² as calculated from the following:    Height as of this encounter: 162.6 cm (64\").    Weight as of this encounter: 71.3 kg (157 lb 3.2 oz).          Physical Exam  Vitals and nursing note reviewed.   Constitutional:       Appearance: She is well-developed.   HENT:      Head: Normocephalic and atraumatic.   Musculoskeletal:      Cervical back: Normal range of motion and neck supple.   Neurological:      Mental Status: She is alert and oriented to person, place, and time.   Psychiatric:         Behavior: Behavior normal.        Result Review :                Assessment and Plan   Diagnoses and all orders for this visit:             Acute pain of right knee  -     XR Knee 3 View Right  -     Ambulatory Referral to Orthopedic Surgery    For her knee pain, I did discuss with her that we can get some x-rays on the knee.  I recommend Voltaren for the pain.  Would like to have her see orthopedist.         Follow Up   No follow-ups on file.  Patient was given instructions and counseling regarding her condition or for health maintenance advice. Please see specific information pulled into the AVS if appropriate.       "

## 2022-10-06 NOTE — PROGRESS NOTES
Arun Ortiz is a 51 y.o. female and is here for a comprehensive physical exam. The patient reports no problems.    Pt is UTD with annual gyn exam and mammo           Social History:   Social History     Socioeconomic History   • Marital status:    Tobacco Use   • Smoking status: Never Smoker   • Smokeless tobacco: Never Used   Substance and Sexual Activity   • Alcohol use: No   • Drug use: No   • Sexual activity: Yes     Partners: Male     Birth control/protection: None, Surgical       Family History:   Family History   Problem Relation Age of Onset   • Hypertension Mother    • Diabetes Mother        Past Medical History:   Past Medical History:   Diagnosis Date   • Allergic        The following portions of the patient's history were reviewed and updated as appropriate: allergies, current medications, past family history, past medical history, past social history, past surgical history and problem list.    Review of Systems    Review of Systems   Constitutional: Negative for chills and fever.   HENT: Negative for congestion, rhinorrhea, sinus pain and sore throat.    Eyes: Negative for photophobia and visual disturbance.   Respiratory: Negative for cough, chest tightness and shortness of breath.    Cardiovascular: Negative for chest pain and palpitations.   Gastrointestinal: Negative for diarrhea, nausea and vomiting.   Genitourinary: Negative for dysuria, frequency and urgency.   Skin: Negative for rash and wound.   Neurological: Negative for dizziness and syncope.   Psychiatric/Behavioral: Negative for behavioral problems and confusion.       Objective   Physical Exam  Vitals and nursing note reviewed.   Constitutional:       Appearance: She is well-developed.   HENT:      Head: Normocephalic and atraumatic.      Right Ear: External ear normal.      Left Ear: External ear normal.   Cardiovascular:      Rate and Rhythm: Normal rate and regular rhythm.      Heart sounds: Normal heart  sounds.   Pulmonary:      Effort: Pulmonary effort is normal. No respiratory distress.      Breath sounds: Normal breath sounds.   Abdominal:      Palpations: Abdomen is soft.      Tenderness: There is no abdominal tenderness. There is no guarding.   Musculoskeletal:         General: Normal range of motion.      Cervical back: Normal range of motion and neck supple.   Lymphadenopathy:      Cervical: No cervical adenopathy.   Skin:     General: Skin is warm.   Neurological:      Mental Status: She is alert and oriented to person, place, and time.   Psychiatric:         Behavior: Behavior normal.         Vitals:    10/06/22 0933   BP: 118/80   Pulse: 71   Resp: 18   Temp: 97.3 °F (36.3 °C)   SpO2: 98%     Body mass index is 26.98 kg/m².      Medications:   Current Outpatient Medications:   •  dicyclomine (BENTYL) 10 MG capsule, Take 1 capsule by mouth 3 (Three) Times a Day As Needed (abdominal cramps)., Disp: 30 capsule, Rfl: 3  •  raNITIdine (ZANTAC) 150 MG tablet, Take 1 tablet by mouth Every Night., Disp: 30 tablet, Rfl: 6  •  Simethicone 80 MG tablet, Take 160 mg by mouth 3 (Three) Times a Day Before Meals., Disp: 90 each, Rfl: 3       Assessment & Plan   Healthy female exam.      1. Healthcare Maintenance:  2. Patient Counseling:  --Nutrition: Stressed importance of moderation in sodium/caffeine intake, saturated fat and cholesterol, caloric balance, sufficient intake of fresh fruits, vegetables, fiber, calcium and vit D  --Exercise: Recommended 30 minutes of exercise daily.  --Immunizations reviewed.  --Discussed benefits of screening colonoscopy.    Diagnoses and all orders for this visit:    Well woman exam (no gynecological exam)    Screen for colon cancer  -     Ambulatory Referral For Screening Colonoscopy  -     Cologuard - Stool, Per Rectum; Future    Encounter for screening mammogram for malignant neoplasm of breast  -     Mammo Screening Bilateral With CAD    Healthcare maintenance  -     Cancel:  Comprehensive Metabolic Panel; Future  -     Cancel: Hemoglobin A1c; Future  -     Cancel: Hepatitis C Antibody; Future  -     Cancel: CBC & Differential; Future  -     Cancel: Lipid Panel With LDL / HDL Ratio; Future  -     Cancel: Thyroid Panel With TSH; Future  -     CBC & Differential  -     Comprehensive Metabolic Panel  -     Hemoglobin A1c  -     Thyroid Panel With TSH  -     Lipid Panel With LDL / HDL Ratio  -     Hepatitis C Antibody  -     Thyroid Panel With TSH; Future  -     Lipid Panel With LDL / HDL Ratio; Future  -     CBC & Differential; Future  -     Hepatitis C Antibody; Future  -     Hemoglobin A1c; Future  -     Comprehensive Metabolic Panel; Future    Acute pain of right knee  -     XR Knee 3 View Right  -     Ambulatory Referral to Orthopedic Surgery        No follow-ups on file.             Dictated utilizing Dragon Voice Recognition Software

## 2022-10-06 NOTE — TELEPHONE ENCOUNTER
----- Message from Chinyere Palmer sent at 10/6/2022 10:41 AM EDT -----  Contact: [phone]  Contact patient for oa questionairjann

## 2022-10-06 NOTE — PROGRESS NOTES
Please inform the patient of the following abnormal results. IMPRESSION:  1. Minimal degenerative arthritis of the right knee.

## 2022-10-07 LAB
ALBUMIN SERPL-MCNC: 4.6 G/DL (ref 3.5–5.2)
ALBUMIN/GLOB SERPL: 2.2 G/DL
ALP SERPL-CCNC: 69 U/L (ref 39–117)
ALT SERPL-CCNC: 19 U/L (ref 1–33)
AST SERPL-CCNC: 21 U/L (ref 1–32)
BASOPHILS # BLD AUTO: 0.03 10*3/MM3 (ref 0–0.2)
BASOPHILS NFR BLD AUTO: 0.5 % (ref 0–1.5)
BILIRUB SERPL-MCNC: 0.7 MG/DL (ref 0–1.2)
BUN SERPL-MCNC: 9 MG/DL (ref 6–20)
BUN/CREAT SERPL: 13 (ref 7–25)
CALCIUM SERPL-MCNC: 9.1 MG/DL (ref 8.6–10.5)
CHLORIDE SERPL-SCNC: 104 MMOL/L (ref 98–107)
CHOLEST SERPL-MCNC: 215 MG/DL (ref 0–200)
CO2 SERPL-SCNC: 25 MMOL/L (ref 22–29)
CREAT SERPL-MCNC: 0.69 MG/DL (ref 0.57–1)
EGFRCR SERPLBLD CKD-EPI 2021: 106.5 ML/MIN/1.73
EOSINOPHIL # BLD AUTO: 0.11 10*3/MM3 (ref 0–0.4)
EOSINOPHIL NFR BLD AUTO: 1.7 % (ref 0.3–6.2)
ERYTHROCYTE [DISTWIDTH] IN BLOOD BY AUTOMATED COUNT: 13 % (ref 12.3–15.4)
FT4I SERPL CALC-MCNC: 2.1 (ref 1.2–4.9)
GLOBULIN SER CALC-MCNC: 2.1 GM/DL
GLUCOSE SERPL-MCNC: 95 MG/DL (ref 65–99)
HBA1C MFR BLD: 5.7 % (ref 4.8–5.6)
HCT VFR BLD AUTO: 38 % (ref 34–46.6)
HCV AB S/CO SERPL IA: <0.1 S/CO RATIO (ref 0–0.9)
HDLC SERPL-MCNC: 48 MG/DL (ref 40–60)
HGB BLD-MCNC: 13.4 G/DL (ref 12–15.9)
IMM GRANULOCYTES # BLD AUTO: 0.02 10*3/MM3 (ref 0–0.05)
IMM GRANULOCYTES NFR BLD AUTO: 0.3 % (ref 0–0.5)
LDLC SERPL CALC-MCNC: 145 MG/DL (ref 0–100)
LDLC/HDLC SERPL: 2.96 {RATIO}
LYMPHOCYTES # BLD AUTO: 2.39 10*3/MM3 (ref 0.7–3.1)
LYMPHOCYTES NFR BLD AUTO: 36 % (ref 19.6–45.3)
MCH RBC QN AUTO: 29.6 PG (ref 26.6–33)
MCHC RBC AUTO-ENTMCNC: 35.3 G/DL (ref 31.5–35.7)
MCV RBC AUTO: 83.9 FL (ref 79–97)
MONOCYTES # BLD AUTO: 0.35 10*3/MM3 (ref 0.1–0.9)
MONOCYTES NFR BLD AUTO: 5.3 % (ref 5–12)
NEUTROPHILS # BLD AUTO: 3.74 10*3/MM3 (ref 1.7–7)
NEUTROPHILS NFR BLD AUTO: 56.2 % (ref 42.7–76)
NRBC BLD AUTO-RTO: 0 /100 WBC (ref 0–0.2)
PLATELET # BLD AUTO: 255 10*3/MM3 (ref 140–450)
POTASSIUM SERPL-SCNC: 4 MMOL/L (ref 3.5–5.2)
PROT SERPL-MCNC: 6.7 G/DL (ref 6–8.5)
RBC # BLD AUTO: 4.53 10*6/MM3 (ref 3.77–5.28)
SODIUM SERPL-SCNC: 136 MMOL/L (ref 136–145)
T3RU NFR SERPL: 24 % (ref 24–39)
T4 SERPL-MCNC: 8.8 UG/DL (ref 4.5–12)
TRIGL SERPL-MCNC: 125 MG/DL (ref 0–150)
TSH SERPL DL<=0.005 MIU/L-ACNC: 2.51 UIU/ML (ref 0.45–4.5)
VLDLC SERPL CALC-MCNC: 22 MG/DL (ref 5–40)
WBC # BLD AUTO: 6.64 10*3/MM3 (ref 3.4–10.8)

## 2022-10-09 RX ORDER — ATORVASTATIN CALCIUM 20 MG/1
20 TABLET, FILM COATED ORAL DAILY
Qty: 90 TABLET | Refills: 3 | Status: SHIPPED | OUTPATIENT
Start: 2022-10-09 | End: 2023-01-09

## 2022-10-09 NOTE — PROGRESS NOTES
Please inform the patient of the following abnormal results. Elevated cholesterol, start atorvastatin. Follow up in 6 wks.

## 2022-10-25 ENCOUNTER — TELEPHONE (OUTPATIENT)
Dept: INTERNAL MEDICINE | Facility: CLINIC | Age: 52
End: 2022-10-25

## 2022-11-21 ENCOUNTER — OFFICE VISIT (OUTPATIENT)
Dept: INTERNAL MEDICINE | Facility: CLINIC | Age: 52
End: 2022-11-21

## 2022-11-21 VITALS
OXYGEN SATURATION: 99 % | HEART RATE: 68 BPM | BODY MASS INDEX: 26.98 KG/M2 | HEIGHT: 64 IN | DIASTOLIC BLOOD PRESSURE: 79 MMHG | WEIGHT: 158 LBS | SYSTOLIC BLOOD PRESSURE: 118 MMHG

## 2022-11-21 DIAGNOSIS — M17.11 PRIMARY OSTEOARTHRITIS OF RIGHT KNEE: ICD-10-CM

## 2022-11-21 DIAGNOSIS — M25.561 ACUTE PAIN OF RIGHT KNEE: Primary | ICD-10-CM

## 2022-11-21 PROCEDURE — 90686 IIV4 VACC NO PRSV 0.5 ML IM: CPT | Performed by: FAMILY MEDICINE

## 2022-11-21 PROCEDURE — 99213 OFFICE O/P EST LOW 20 MIN: CPT | Performed by: FAMILY MEDICINE

## 2022-11-21 PROCEDURE — 90471 IMMUNIZATION ADMIN: CPT | Performed by: FAMILY MEDICINE

## 2022-11-21 NOTE — PROGRESS NOTES
"Chief Complaint  x ray follow up    Subjective        Minnie Ortiz presents to [facility]  History of Present Illness    Patient presented today's office visit for follow-up on her right knee pain has been going on for few months now.  Patient states is worse when she walks up and down the stairs.  There was an x-ray that was done last month which indicate that she has some osteoarthritis of the right knee.  I did discuss with her that she would benefit from seeing orthopedist, for couple reasons including getting an MRI for further testing.  She may also benefit from a steroid injection.    Objective   Vital Signs:  /79 (BP Location: Left arm, Patient Position: Sitting, Cuff Size: Adult)   Pulse 68   Ht 162 cm (63.78\")   Wt 71.7 kg (158 lb)   SpO2 99%   BMI 27.31 kg/m²   Estimated body mass index is 27.31 kg/m² as calculated from the following:    Height as of this encounter: 162 cm (63.78\").    Weight as of this encounter: 71.7 kg (158 lb).          Physical Exam  Vitals and nursing note reviewed.   Constitutional:       Appearance: She is well-developed.   HENT:      Head: Normocephalic and atraumatic.   Musculoskeletal:      Cervical back: Normal range of motion and neck supple.   Neurological:      Mental Status: She is alert and oriented to person, place, and time.   Psychiatric:         Behavior: Behavior normal.        Result Review :                Assessment and Plan   Diagnoses and all orders for this visit:    1. Acute pain of right knee (Primary)    2. Primary osteoarthritis of right knee  -     Ambulatory Referral to Orthopedic Surgery    Other orders  -     FluLaval/Fluzone >6 mos (3648-3302)    Today's office visit we will refer her to orthopedics.  She may need an MRI to rule out any ligament involvement.  But she would benefit from a steroid injection to help her with the pain.  I recommend that she do over-the-counter Voltaren gel.         Follow Up "   No follow-ups on file.  Patient was given instructions and counseling regarding her condition or for health maintenance advice. Please see specific information pulled into the AVS if appropriate.

## 2023-01-09 ENCOUNTER — OFFICE VISIT (OUTPATIENT)
Dept: INTERNAL MEDICINE | Facility: CLINIC | Age: 53
End: 2023-01-09
Payer: COMMERCIAL

## 2023-01-09 VITALS
HEIGHT: 64 IN | DIASTOLIC BLOOD PRESSURE: 70 MMHG | WEIGHT: 156.2 LBS | BODY MASS INDEX: 26.67 KG/M2 | SYSTOLIC BLOOD PRESSURE: 110 MMHG | HEART RATE: 60 BPM | TEMPERATURE: 95.6 F | OXYGEN SATURATION: 100 %

## 2023-01-09 DIAGNOSIS — R05.1 ACUTE COUGH: Primary | ICD-10-CM

## 2023-01-09 DIAGNOSIS — J06.9 UPPER RESPIRATORY TRACT INFECTION, UNSPECIFIED TYPE: ICD-10-CM

## 2023-01-09 DIAGNOSIS — R50.9 FEVER, UNSPECIFIED FEVER CAUSE: ICD-10-CM

## 2023-01-09 LAB
EXPIRATION DATE: NORMAL
FLUAV AG UPPER RESP QL IA.RAPID: NOT DETECTED
FLUBV AG UPPER RESP QL IA.RAPID: NOT DETECTED
INTERNAL CONTROL: NORMAL
Lab: NORMAL
SARS-COV-2 AG UPPER RESP QL IA.RAPID: NOT DETECTED

## 2023-01-09 PROCEDURE — 99213 OFFICE O/P EST LOW 20 MIN: CPT | Performed by: NURSE PRACTITIONER

## 2023-01-09 PROCEDURE — 87428 SARSCOV & INF VIR A&B AG IA: CPT | Performed by: NURSE PRACTITIONER

## 2023-01-09 RX ORDER — BENZONATATE 200 MG/1
200 CAPSULE ORAL 3 TIMES DAILY PRN
Qty: 21 CAPSULE | Refills: 0 | Status: SHIPPED | OUTPATIENT
Start: 2023-01-09 | End: 2023-01-16

## 2023-01-09 RX ORDER — DEXTROMETHORPHAN HYDROBROMIDE AND PROMETHAZINE HYDROCHLORIDE 15; 6.25 MG/5ML; MG/5ML
5 SYRUP ORAL 2 TIMES DAILY PRN
Qty: 118 ML | Refills: 0 | Status: SHIPPED | OUTPATIENT
Start: 2023-01-09 | End: 2023-01-16

## 2023-01-09 RX ORDER — AMOXICILLIN AND CLAVULANATE POTASSIUM 875; 125 MG/1; MG/1
1 TABLET, FILM COATED ORAL 2 TIMES DAILY
Qty: 20 TABLET | Refills: 0 | Status: SHIPPED | OUTPATIENT
Start: 2023-01-09 | End: 2023-01-19

## 2023-01-09 NOTE — PATIENT INSTRUCTIONS
If you develop any shortness of breath or high fever that does not come down with Tylenol or ibuprofen please go to the emergency room.

## 2023-01-09 NOTE — PROGRESS NOTES
Chief Complaint  Cough, Fever, sinus drainage, and Hoarse (For the last 3-4 days)    Subjective        Minnie Ortiz presents to [facility]  History of Present Illness    Patient is a pleasant 52-year-old female who typically sees  here in the office.  She is here today with her  and she is give me verbal consent to speak about her care on today's office visit.  Patient complains of cough, fever, drainage with hoarseness over the last 3 to 4 days.  Patient has been taking Advil and Nyquil now for her symptoms.  Not helping much at this time.  Denies any shortness of breath at this time.    Objective   Vital Signs:  /70 (BP Location: Left arm, Patient Position: Sitting, Cuff Size: Adult)   Pulse 60   Temp 95.6 °F (35.3 °C) (Infrared)   Ht 162 cm (63.78\")   Wt 70.9 kg (156 lb 3.2 oz)   SpO2 100%   BMI 27.00 kg/m²   Estimated body mass index is 27 kg/m² as calculated from the following:    Height as of this encounter: 162 cm (63.78\").    Weight as of this encounter: 70.9 kg (156 lb 3.2 oz).          Physical Exam  Vitals and nursing note reviewed.   Constitutional:       Appearance: Normal appearance.   HENT:      Head: Normocephalic.      Nose: Congestion present.      Comments:  complains of cough, fever, drainage with hoarseness over the last 3 to 4 days     Mouth/Throat:      Mouth: Mucous membranes are moist.      Pharynx: Oropharynx is clear. No oropharyngeal exudate or posterior oropharyngeal erythema.   Eyes:      Pupils: Pupils are equal, round, and reactive to light.   Cardiovascular:      Rate and Rhythm: Normal rate and regular rhythm.      Comments: No peripheral edema  Pulmonary:      Effort: Pulmonary effort is normal. No respiratory distress.      Breath sounds: Normal breath sounds. No stridor. No wheezing, rhonchi or rales.      Comments: Denies shortness of breath  Chest:      Chest wall: No tenderness.   Musculoskeletal:          General: Normal range of motion.   Skin:     General: Skin is warm.      Capillary Refill: Capillary refill takes less than 2 seconds.   Neurological:      Mental Status: She is alert and oriented to person, place, and time.   Psychiatric:         Behavior: Behavior normal.        Result Review :    Common labs    Common Labs 10/6/22 10/6/22 10/6/22 10/6/22    1039 1039 1039 1039   Glucose 95      BUN 9      Creatinine 0.69      Sodium 136      Potassium 4.0      Chloride 104      Calcium 9.1      Total Protein 6.7      Albumin 4.60      Total Bilirubin 0.7      Alkaline Phosphatase 69      AST (SGOT) 21      ALT (SGPT) 19      WBC   6.64    Hemoglobin   13.4    Hematocrit   38.0    Platelets   255    Total Cholesterol    215 (A)   Triglycerides    125   HDL Cholesterol    48   LDL Cholesterol     145 (A)   Hemoglobin A1C  5.70 (A)     (A) Abnormal value       Comments are available for some flowsheets but are not being displayed.                     Assessment and Plan   Diagnoses and all orders for this visit:    1. Acute cough (Primary)  -     POCT SARS-CoV-2 Antigen TREVOR + Flu    2. Fever, unspecified fever cause  -     POCT SARS-CoV-2 Antigen TREVOR + Flu    3. Upper respiratory tract infection, unspecified type    Other orders  -     amoxicillin-clavulanate (Augmentin) 875-125 MG per tablet; Take 1 tablet by mouth 2 (Two) Times a Day for 10 days.  Dispense: 20 tablet; Refill: 0  -     benzonatate (TESSALON) 200 MG capsule; Take 1 capsule by mouth 3 (Three) Times a Day As Needed for Cough for up to 7 days.  Dispense: 21 capsule; Refill: 0  -     promethazine-dextromethorphan (PROMETHAZINE-DM) 6.25-15 MG/5ML syrup; Take 5 mL by mouth 2 (Two) Times a Day As Needed for Cough for up to 7 days.  Dispense: 118 mL; Refill: 0      Medications as directed.   If you develop any shortness of breath or high fever that does not come down with Tylenol or ibuprofen please go to the emergency room.  Work note provided.   Negative  Covid and Flu test in the office.          Follow Up   Return if symptoms worsen or fail to improve.  Patient was given instructions and counseling regarding her condition or for health maintenance advice. Please see specific information pulled into the AVS if appropriate.

## 2023-01-09 NOTE — LETTER
January 9, 2023     Patient: Minnie Ortiz   YOB: 1970   Date of Visit: 1/9/2023       To Whom It May Concern:    It is my medical opinion that Minnie Ortiz may return to work in two days 01/11/2023.         Sincerely,        JOSÉ MIGUEL Gonzáles    CC: No Recipients

## 2023-10-23 ENCOUNTER — OFFICE VISIT (OUTPATIENT)
Dept: INTERNAL MEDICINE | Facility: CLINIC | Age: 53
End: 2023-10-23
Payer: COMMERCIAL

## 2023-10-23 VITALS
HEART RATE: 77 BPM | BODY MASS INDEX: 28.17 KG/M2 | HEIGHT: 64 IN | TEMPERATURE: 97.5 F | RESPIRATION RATE: 16 BRPM | OXYGEN SATURATION: 97 % | WEIGHT: 165 LBS | DIASTOLIC BLOOD PRESSURE: 68 MMHG | SYSTOLIC BLOOD PRESSURE: 100 MMHG

## 2023-10-23 DIAGNOSIS — Z00.00 WELL WOMAN EXAM (NO GYNECOLOGICAL EXAM): Primary | ICD-10-CM

## 2023-10-23 DIAGNOSIS — Z00.00 HEALTHCARE MAINTENANCE: ICD-10-CM

## 2023-10-23 DIAGNOSIS — M25.562 ACUTE PAIN OF BOTH KNEES: ICD-10-CM

## 2023-10-23 DIAGNOSIS — Z12.31 ENCOUNTER FOR SCREENING MAMMOGRAM FOR MALIGNANT NEOPLASM OF BREAST: ICD-10-CM

## 2023-10-23 DIAGNOSIS — E55.9 VITAMIN D DEFICIENCY: ICD-10-CM

## 2023-10-23 DIAGNOSIS — M25.561 ACUTE PAIN OF BOTH KNEES: ICD-10-CM

## 2023-10-23 RX ORDER — DICLOFENAC SODIUM 30 MG/G
GEL TOPICAL 2 TIMES DAILY
Qty: 100 G | Refills: 6 | Status: SHIPPED | OUTPATIENT
Start: 2023-10-23

## 2023-10-23 RX ORDER — ERGOCALCIFEROL 1.25 MG/1
50000 CAPSULE ORAL
Qty: 12 CAPSULE | Refills: 1 | Status: SHIPPED | OUTPATIENT
Start: 2023-10-23

## 2023-10-24 LAB
25(OH)D3+25(OH)D2 SERPL-MCNC: 11.4 NG/ML (ref 30–100)
ALBUMIN SERPL-MCNC: 4.5 G/DL (ref 3.9–4.9)
ALBUMIN/GLOB SERPL: 1.6 {RATIO} (ref 1.2–2.2)
ALP SERPL-CCNC: 92 IU/L (ref 44–121)
ALT SERPL-CCNC: 28 IU/L (ref 0–32)
AST SERPL-CCNC: 21 IU/L (ref 0–40)
BASOPHILS # BLD AUTO: 0 X10E3/UL (ref 0–0.2)
BASOPHILS NFR BLD AUTO: 0 %
BILIRUB SERPL-MCNC: 0.6 MG/DL (ref 0–1.2)
BUN SERPL-MCNC: 11 MG/DL (ref 6–24)
BUN/CREAT SERPL: 15 (ref 9–23)
CALCIUM SERPL-MCNC: 9 MG/DL (ref 8.7–10.2)
CHLORIDE SERPL-SCNC: 105 MMOL/L (ref 96–106)
CHOLEST SERPL-MCNC: 219 MG/DL (ref 100–199)
CO2 SERPL-SCNC: 23 MMOL/L (ref 20–29)
CREAT SERPL-MCNC: 0.75 MG/DL (ref 0.57–1)
EGFRCR SERPLBLD CKD-EPI 2021: 97 ML/MIN/1.73
EOSINOPHIL # BLD AUTO: 0.2 X10E3/UL (ref 0–0.4)
EOSINOPHIL NFR BLD AUTO: 2 %
ERYTHROCYTE [DISTWIDTH] IN BLOOD BY AUTOMATED COUNT: 13 % (ref 11.7–15.4)
FT4I SERPL CALC-MCNC: 1.6 (ref 1.2–4.9)
GLOBULIN SER CALC-MCNC: 2.8 G/DL (ref 1.5–4.5)
GLUCOSE SERPL-MCNC: 104 MG/DL (ref 70–99)
HBA1C MFR BLD: 5.9 % (ref 4.8–5.6)
HCT VFR BLD AUTO: 40.9 % (ref 34–46.6)
HDLC SERPL-MCNC: 43 MG/DL
HGB BLD-MCNC: 13.5 G/DL (ref 11.1–15.9)
IMM GRANULOCYTES # BLD AUTO: 0 X10E3/UL (ref 0–0.1)
IMM GRANULOCYTES NFR BLD AUTO: 0 %
LDLC SERPL CALC-MCNC: 135 MG/DL (ref 0–99)
LDLC/HDLC SERPL: 3.1 RATIO (ref 0–3.2)
LYMPHOCYTES # BLD AUTO: 3.2 X10E3/UL (ref 0.7–3.1)
LYMPHOCYTES NFR BLD AUTO: 40 %
MCH RBC QN AUTO: 28 PG (ref 26.6–33)
MCHC RBC AUTO-ENTMCNC: 33 G/DL (ref 31.5–35.7)
MCV RBC AUTO: 85 FL (ref 79–97)
MONOCYTES # BLD AUTO: 0.4 X10E3/UL (ref 0.1–0.9)
MONOCYTES NFR BLD AUTO: 6 %
NEUTROPHILS # BLD AUTO: 4.2 X10E3/UL (ref 1.4–7)
NEUTROPHILS NFR BLD AUTO: 52 %
PLATELET # BLD AUTO: 264 X10E3/UL (ref 150–450)
POTASSIUM SERPL-SCNC: 4 MMOL/L (ref 3.5–5.2)
PROT SERPL-MCNC: 7.3 G/DL (ref 6–8.5)
RBC # BLD AUTO: 4.83 X10E6/UL (ref 3.77–5.28)
SODIUM SERPL-SCNC: 142 MMOL/L (ref 134–144)
T3RU NFR SERPL: 21 % (ref 24–39)
T4 SERPL-MCNC: 7.8 UG/DL (ref 4.5–12)
TRIGL SERPL-MCNC: 228 MG/DL (ref 0–149)
TSH SERPL DL<=0.005 MIU/L-ACNC: 2.53 UIU/ML (ref 0.45–4.5)
VLDLC SERPL CALC-MCNC: 41 MG/DL (ref 5–40)
WBC # BLD AUTO: 7.9 X10E3/UL (ref 3.4–10.8)

## 2023-10-30 DIAGNOSIS — E55.9 VITAMIN D DEFICIENCY: ICD-10-CM

## 2023-10-30 RX ORDER — ERGOCALCIFEROL 1.25 MG/1
50000 CAPSULE ORAL
Qty: 12 CAPSULE | Refills: 1 | Status: SHIPPED | OUTPATIENT
Start: 2023-10-30

## 2023-10-30 RX ORDER — PRAVASTATIN SODIUM 10 MG
10 TABLET ORAL DAILY
Qty: 90 TABLET | Refills: 3 | Status: SHIPPED | OUTPATIENT
Start: 2023-10-30

## 2023-10-31 NOTE — PROGRESS NOTES
Arun Ortiz is a 52 y.o. female and is here for a comprehensive physical exam. The patient reports no problems.    Pt is UTD with annual gyn exam and mammo           Social History:   Social History     Socioeconomic History    Marital status:    Tobacco Use    Smoking status: Never    Smokeless tobacco: Never   Substance and Sexual Activity    Alcohol use: No    Drug use: No    Sexual activity: Yes     Partners: Male     Birth control/protection: None, Surgical       Family History:   Family History   Problem Relation Age of Onset    Hypertension Mother     Diabetes Mother        Past Medical History:   Past Medical History:   Diagnosis Date    Allergic        The following portions of the patient's history were reviewed and updated as appropriate: allergies, current medications, past family history, past medical history, past social history, past surgical history and problem list.    Review of Systems    Review of Systems   Constitutional:  Negative for chills and fever.   HENT:  Negative for congestion, rhinorrhea, sinus pain and sore throat.    Eyes:  Negative for photophobia and visual disturbance.   Respiratory:  Negative for cough, chest tightness and shortness of breath.    Cardiovascular:  Negative for chest pain and palpitations.   Gastrointestinal:  Negative for diarrhea, nausea and vomiting.   Genitourinary:  Negative for dysuria, frequency and urgency.   Skin:  Negative for rash and wound.   Neurological:  Negative for dizziness and syncope.   Psychiatric/Behavioral:  Negative for behavioral problems and confusion.        Objective   Physical Exam  Vitals and nursing note reviewed.   Constitutional:       Appearance: She is well-developed.   HENT:      Head: Normocephalic and atraumatic.      Right Ear: External ear normal.      Left Ear: External ear normal.   Cardiovascular:      Rate and Rhythm: Normal rate and regular rhythm.      Heart sounds: Normal heart sounds.    Pulmonary:      Effort: Pulmonary effort is normal. No respiratory distress.      Breath sounds: Normal breath sounds.   Abdominal:      Palpations: Abdomen is soft.      Tenderness: There is no abdominal tenderness. There is no guarding.   Musculoskeletal:         General: Normal range of motion.      Cervical back: Normal range of motion and neck supple.   Lymphadenopathy:      Cervical: No cervical adenopathy.   Skin:     General: Skin is warm.   Neurological:      Mental Status: She is alert and oriented to person, place, and time.   Psychiatric:         Behavior: Behavior normal.         Vitals:    10/23/23 1335   BP: 100/68   Pulse: 77   Resp: 16   Temp: 97.5 °F (36.4 °C)   SpO2: 97%     Body mass index is 28.52 kg/m².      Medications:   Current Outpatient Medications:     Diclofenac Sodium 3 % gel gel, Apply  topically to the appropriate area as directed 2 (Two) Times a Day. for 60 days., Disp: 100 g, Rfl: 6    pravastatin (PRAVACHOL) 10 MG tablet, Take 1 tablet by mouth Daily., Disp: 90 tablet, Rfl: 3    vitamin D (ERGOCALCIFEROL) 1.25 MG (26390 UT) capsule capsule, Take 1 capsule by mouth Every 7 (Seven) Days., Disp: 12 capsule, Rfl: 1    vitamin D (ERGOCALCIFEROL) 1.25 MG (75160 UT) capsule capsule, Take 1 capsule by mouth Every 7 (Seven) Days., Disp: 12 capsule, Rfl: 1       Assessment & Plan   Healthy female exam.      1. Healthcare Maintenance:  2. Patient Counseling:  --Nutrition: Stressed importance of moderation in sodium/caffeine intake, saturated fat and cholesterol, caloric balance, sufficient intake of fresh fruits, vegetables, fiber, calcium and vit D  --Exercise: Recommended 30 minutes of exercise daily.  --Immunizations reviewed.  --Discussed benefits of screening colonoscopy.    Diagnoses and all orders for this visit:    Well woman exam (no gynecological exam)    Healthcare maintenance  -     CBC & Differential  -     Comprehensive Metabolic Panel  -     Hemoglobin A1c  -     Thyroid  Panel With TSH  -     Lipid Panel With LDL / HDL Ratio    Vitamin D deficiency  -     vitamin D (ERGOCALCIFEROL) 1.25 MG (80870 UT) capsule capsule; Take 1 capsule by mouth Every 7 (Seven) Days.  -     Vitamin D,25-Hydroxy    Encounter for screening mammogram for malignant neoplasm of breast  -     Mammo Screening Bilateral With CAD        No follow-ups on file.             Dictated utilizing Dragon Voice Recognition Software

## 2023-10-31 NOTE — PROGRESS NOTES
Please inform the patient of the following abnormal results. Elevated cholesterol, start pravastatin 10mg daily. Prediabetic, needs to diet and exercise. Low vitamin d, needs 11796 iu weekly. Will reevaluate in januarly.

## 2023-10-31 NOTE — PROGRESS NOTES
"Chief Complaint  Annual Exam and right side swelling (Pt states it is her right side of her body)    Subjective        Minnie Ortiz presents to [facility]  History of Present Illness    Has vitamin D deficiency.  I did discuss with her that she needs to take some vitamin D supplementation.    She also has history having some bilateral knee pain.  Is unclear exactly why she is experiencing the bilateral knee pain.  She states that she might believe that she has some underlying arthritis.  I discussed with her that we can get this further checked.    Objective   Vital Signs:  /68   Pulse 77   Temp 97.5 °F (36.4 °C)   Resp 16   Ht 162 cm (63.78\")   Wt 74.8 kg (165 lb)   SpO2 97%   BMI 28.52 kg/m²   Estimated body mass index is 28.52 kg/m² as calculated from the following:    Height as of this encounter: 162 cm (63.78\").    Weight as of this encounter: 74.8 kg (165 lb).             Physical Exam  Vitals and nursing note reviewed.   Constitutional:       Appearance: She is well-developed.   HENT:      Head: Normocephalic and atraumatic.   Musculoskeletal:      Cervical back: Normal range of motion and neck supple.   Neurological:      Mental Status: She is alert and oriented to person, place, and time.   Psychiatric:         Behavior: Behavior normal.        Result Review :                   Assessment and Plan   Diagnoses and all orders for this visit:        Healthcare maintenance  -     CBC & Differential  -     Comprehensive Metabolic Panel  -     Hemoglobin A1c  -     Thyroid Panel With TSH  -     Lipid Panel With LDL / HDL Ratio    Acute pain of both knees  -     MRI Knee Right Without Contrast; Future  -     MRI Knee Left Without Contrast; Future  -     Diclofenac Sodium 3 % gel gel; Apply  topically to the appropriate area as directed 2 (Two) Times a Day. for 60 days.  Dispense: 100 g; Refill: 6    Vitamin D deficiency  -     vitamin D (ERGOCALCIFEROL) 1.25 MG " (13438 UT) capsule capsule; Take 1 capsule by mouth Every 7 (Seven) Days.  Dispense: 12 capsule; Refill: 1  -     Vitamin D,25-Hydroxy        For the bilateral knee pain, order MRIs of both the knees.  We will also do diclofenac gel for this.  For the vitamin D deficiency, will check vitamin D levels, and start on 50,000 IUs of vitamin D weekly.         Follow Up   No follow-ups on file.  Patient was given instructions and counseling regarding her condition or for health maintenance advice. Please see specific information pulled into the AVS if appropriate.

## 2023-11-22 ENCOUNTER — TELEPHONE (OUTPATIENT)
Dept: INTERNAL MEDICINE | Facility: CLINIC | Age: 53
End: 2023-11-22

## 2023-11-22 NOTE — TELEPHONE ENCOUNTER
Comments    10/31 I spoke with pt and she states that her right breast has been swelling since April. With swelling, Mandaen would like a diag mammo and breast us. Since it is time for the pts screening mammo, will you please order a bilateral diag maryjo mammo and us, thank you. Routing back to MDO

## 2023-11-28 ENCOUNTER — HOSPITAL ENCOUNTER (OUTPATIENT)
Dept: MRI IMAGING | Facility: HOSPITAL | Age: 53
Discharge: HOME | End: 2023-11-28
Payer: COMMERCIAL

## 2023-11-28 ENCOUNTER — HOSPITAL ENCOUNTER (OUTPATIENT)
Dept: MRI IMAGING | Facility: HOSPITAL | Age: 53
Discharge: HOME | End: 2023-11-28
Admitting: FAMILY MEDICINE
Payer: COMMERCIAL

## 2023-11-28 DIAGNOSIS — M25.562 ACUTE PAIN OF BOTH KNEES: ICD-10-CM

## 2023-11-28 DIAGNOSIS — M25.561 ACUTE PAIN OF BOTH KNEES: ICD-10-CM

## 2023-11-28 PROCEDURE — 73721 MRI JNT OF LWR EXTRE W/O DYE: CPT

## 2023-12-04 NOTE — PROGRESS NOTES
Please inform the patient of the following abnormal results.     IMPRESSION:  Chronic appearing tendinopathy at the distal quadriceps  tendon and proximal left patellar tendon without evidence of tear. There  is also a small area of chronic appearing chondromalacia at the femoral  trochlea. No other internal derangement identified at the left knee.      Her symptoms are chronic, if she would like to see orthopedics then I can put in a referral.

## 2023-12-04 NOTE — PROGRESS NOTES
Please inform the patient of the following abnormal results.     IMPRESSION:  Chondromalacia at the patellofemoral compartment of the  right knee. Chronic appearing mild distal quadriceps tendinopathy  without tear. No other internal derangement at the right knee.      Will refer to orthopedics if pain continues. Would she like the referral?

## 2023-12-18 DIAGNOSIS — R92.8 ABNORMAL MAMMOGRAM: Primary | ICD-10-CM

## 2024-01-23 ENCOUNTER — OFFICE VISIT (OUTPATIENT)
Dept: INTERNAL MEDICINE | Facility: CLINIC | Age: 54
End: 2024-01-23
Payer: COMMERCIAL

## 2024-01-23 VITALS
DIASTOLIC BLOOD PRESSURE: 62 MMHG | HEART RATE: 81 BPM | WEIGHT: 165.1 LBS | SYSTOLIC BLOOD PRESSURE: 112 MMHG | BODY MASS INDEX: 28.19 KG/M2 | HEIGHT: 64 IN | TEMPERATURE: 96.8 F | OXYGEN SATURATION: 98 % | RESPIRATION RATE: 14 BRPM

## 2024-01-23 DIAGNOSIS — R73.03 PREDIABETES: ICD-10-CM

## 2024-01-23 DIAGNOSIS — M25.562 CHRONIC PAIN OF BOTH KNEES: Primary | ICD-10-CM

## 2024-01-23 DIAGNOSIS — G89.29 CHRONIC PAIN OF BOTH KNEES: Primary | ICD-10-CM

## 2024-01-23 DIAGNOSIS — M25.561 CHRONIC PAIN OF BOTH KNEES: Primary | ICD-10-CM

## 2024-01-23 DIAGNOSIS — E78.49 OTHER HYPERLIPIDEMIA: ICD-10-CM

## 2024-01-23 DIAGNOSIS — E55.9 VITAMIN D DEFICIENCY: ICD-10-CM

## 2024-01-23 PROCEDURE — 99214 OFFICE O/P EST MOD 30 MIN: CPT | Performed by: FAMILY MEDICINE

## 2024-01-23 RX ORDER — PRAVASTATIN SODIUM 10 MG
10 TABLET ORAL DAILY
Qty: 90 TABLET | Refills: 3 | Status: SHIPPED | OUTPATIENT
Start: 2024-01-23

## 2024-01-23 RX ORDER — ERGOCALCIFEROL 1.25 MG/1
50000 CAPSULE ORAL
Qty: 12 CAPSULE | Refills: 1 | Status: SHIPPED | OUTPATIENT
Start: 2024-01-23

## 2024-01-27 NOTE — PROGRESS NOTES
"Chief Complaint  Knee Pain (Rt knee 3 month fup), Hyperlipidemia, and hormone inbalance (Discuss abn l cycles night sweats)    Subjective        Minnie Ortiz presents to [facility]  Knee Pain     Hyperlipidemia        Patient presents at today's visit with a history having chronic pain in both of her knees.  She does states that on the side of the knees.  I did discuss with her that she would benefit from doing physical therapy.  Which she was open to doing.    Patient does have a history of having vitamin D deficiency.  She is currently on 50,000 IUs of vitamin D weekly.  She denies any side effects of the medication.    She has a history of hyperlipidemia.  Patient is currently on pravastatin 10 mg daily.  Patient denies any side effects of the medication.    She does have prediabetes.  She is monitoring with diet and exercise.    Objective   Vital Signs:  /62   Pulse 81   Temp 96.8 °F (36 °C) (Temporal)   Resp 14   Ht 162 cm (63.78\")   Wt 74.9 kg (165 lb 1.6 oz)   SpO2 98%   BMI 28.54 kg/m²   Estimated body mass index is 28.54 kg/m² as calculated from the following:    Height as of this encounter: 162 cm (63.78\").    Weight as of this encounter: 74.9 kg (165 lb 1.6 oz).               Physical Exam  Vitals and nursing note reviewed.   Constitutional:       Appearance: She is well-developed.   HENT:      Head: Normocephalic and atraumatic.   Musculoskeletal:      Cervical back: Normal range of motion and neck supple.   Neurological:      Mental Status: She is alert and oriented to person, place, and time.   Psychiatric:         Behavior: Behavior normal.        Result Review :                     Assessment and Plan     Diagnoses and all orders for this visit:    1. Chronic pain of both knees (Primary)  -     Ambulatory Referral to Physical Therapy Evaluate and treat    2. Vitamin D deficiency  -     vitamin D (ERGOCALCIFEROL) 1.25 MG (43570 UT) capsule capsule; " Take 1 capsule by mouth Every 7 (Seven) Days.  Dispense: 12 capsule; Refill: 1  -     Vitamin D,25-Hydroxy    3. Other hyperlipidemia  -     pravastatin (PRAVACHOL) 10 MG tablet; Take 1 tablet by mouth Daily.  Dispense: 90 tablet; Refill: 3  -     Comprehensive Metabolic Panel  -     Lipid Panel With LDL / HDL Ratio    4. Prediabetes  -     Hemoglobin A1c    For her chronic pain of her knees, will refer to PT.  For the hyperlipidemia, continue pravastatin 10 mg daily.  For the vitamin D deficiency, continue vitamin D supplementation.  For the prediabetes, continue diet and excise.         Follow Up     No follow-ups on file.  Patient was given instructions and counseling regarding her condition or for health maintenance advice. Please see specific information pulled into the AVS if appropriate.

## 2024-04-19 ENCOUNTER — OFFICE VISIT (OUTPATIENT)
Dept: INTERNAL MEDICINE | Facility: CLINIC | Age: 54
End: 2024-04-19
Payer: COMMERCIAL

## 2024-04-19 VITALS
DIASTOLIC BLOOD PRESSURE: 68 MMHG | RESPIRATION RATE: 12 BRPM | SYSTOLIC BLOOD PRESSURE: 110 MMHG | OXYGEN SATURATION: 99 % | BODY MASS INDEX: 28.68 KG/M2 | HEART RATE: 74 BPM | HEIGHT: 64 IN | WEIGHT: 168 LBS

## 2024-04-19 DIAGNOSIS — S86.811A STRAIN OF RIGHT CALF MUSCLE: Primary | ICD-10-CM

## 2024-04-19 DIAGNOSIS — E78.49 OTHER HYPERLIPIDEMIA: ICD-10-CM

## 2024-04-19 DIAGNOSIS — E55.9 VITAMIN D DEFICIENCY: ICD-10-CM

## 2024-04-19 DIAGNOSIS — R73.03 PREDIABETES: ICD-10-CM

## 2024-04-19 RX ORDER — DICLOFENAC SODIUM 30 MG/G
GEL TOPICAL 2 TIMES DAILY
Qty: 100 G | Refills: 3 | Status: SHIPPED | OUTPATIENT
Start: 2024-04-19

## 2024-04-19 RX ORDER — ERGOCALCIFEROL 1.25 MG/1
50000 CAPSULE ORAL
Qty: 12 CAPSULE | Refills: 1 | Status: SHIPPED | OUTPATIENT
Start: 2024-04-19

## 2024-04-19 RX ORDER — PRAVASTATIN SODIUM 10 MG
10 TABLET ORAL DAILY
Qty: 90 TABLET | Refills: 3 | Status: SHIPPED | OUTPATIENT
Start: 2024-04-19 | End: 2024-04-20

## 2024-04-20 LAB
25(OH)D3+25(OH)D2 SERPL-MCNC: 11.7 NG/ML (ref 30–100)
ALBUMIN SERPL-MCNC: 4.6 G/DL (ref 3.5–5.2)
ALBUMIN/GLOB SERPL: 1.6 G/DL
ALP SERPL-CCNC: 89 U/L (ref 39–117)
ALT SERPL-CCNC: 24 U/L (ref 1–33)
AST SERPL-CCNC: 25 U/L (ref 1–32)
BILIRUB SERPL-MCNC: 0.8 MG/DL (ref 0–1.2)
BUN SERPL-MCNC: 9 MG/DL (ref 6–20)
BUN/CREAT SERPL: 11.8 (ref 7–25)
CALCIUM SERPL-MCNC: 9.6 MG/DL (ref 8.6–10.5)
CHLORIDE SERPL-SCNC: 104 MMOL/L (ref 98–107)
CHOLEST SERPL-MCNC: 222 MG/DL (ref 0–200)
CO2 SERPL-SCNC: 25.1 MMOL/L (ref 22–29)
CREAT SERPL-MCNC: 0.76 MG/DL (ref 0.57–1)
EGFRCR SERPLBLD CKD-EPI 2021: 95 ML/MIN/1.73
GLOBULIN SER CALC-MCNC: 2.9 GM/DL
GLUCOSE SERPL-MCNC: 95 MG/DL (ref 65–99)
HBA1C MFR BLD: 5.7 % (ref 4.8–5.6)
HDLC SERPL-MCNC: 49 MG/DL (ref 40–60)
LDLC SERPL CALC-MCNC: 147 MG/DL (ref 0–100)
LDLC/HDLC SERPL: 2.95 {RATIO}
POTASSIUM SERPL-SCNC: 4.4 MMOL/L (ref 3.5–5.2)
PROT SERPL-MCNC: 7.5 G/DL (ref 6–8.5)
SODIUM SERPL-SCNC: 139 MMOL/L (ref 136–145)
TRIGL SERPL-MCNC: 142 MG/DL (ref 0–150)
VLDLC SERPL CALC-MCNC: 26 MG/DL (ref 5–40)

## 2024-04-20 RX ORDER — PRAVASTATIN SODIUM 40 MG
40 TABLET ORAL DAILY
Qty: 90 TABLET | Refills: 3 | Status: SHIPPED | OUTPATIENT
Start: 2024-04-20

## 2024-04-20 NOTE — PROGRESS NOTES
Please inform the patient of the following abnormal results. Cholesterol is elevated, needs to increase pravastatin to 40mg daily. Needs to do 62257 iu of vitamin d weekly.

## 2024-04-21 NOTE — PROGRESS NOTES
"Chief Complaint  Pain    Subjective          Minnie Ortiz presents to [facility]  History of Present Illness  The patient is a 53-year-old female who presents for evaluation of right calf pain.    The patient reports experiencing pain in her right calf, which initiated yesterday evening when she abruptly awoke from sleep. She describes a sensation akin to a pulled muscle, accompanied by pain in her foot. She denies any recent travel history. She acknowledges a history of recurrent calf strain, but notes an improvement in her condition since the previous day.    Objective   Vital Signs:   /68 (BP Location: Left arm, Patient Position: Sitting, Cuff Size: Adult)   Pulse 74   Resp 12   Ht 162 cm (63.78\")   Wt 76.2 kg (168 lb)   SpO2 99%   BMI 29.04 kg/m²     Physical Exam  Vitals and nursing note reviewed.   Constitutional:       Appearance: She is well-developed.   HENT:      Head: Normocephalic and atraumatic.   Musculoskeletal:      Cervical back: Normal range of motion and neck supple.   Neurological:      Mental Status: She is alert and oriented to person, place, and time.   Psychiatric:         Behavior: Behavior normal.         Physical Exam  The calf in the musculoskeletal system is not red or swollen, but there is pain.     Result Review :                 Assessment and Plan    Diagnoses and all orders for this visit:    1. Strain of right calf muscle (Primary)  -     Diclofenac Sodium 3 % gel gel; Apply  topically to the appropriate area as directed 2 (Two) Times a Day. for 60 days.  Dispense: 100 g; Refill: 3  -     Ambulatory Referral to Physical Therapy    2. Vitamin D deficiency  -     vitamin D (ERGOCALCIFEROL) 1.25 MG (98513 UT) capsule capsule; Take 1 capsule by mouth Every 7 (Seven) Days.  Dispense: 12 capsule; Refill: 1  -     Vitamin D,25-Hydroxy    3. Prediabetes  -     Hemoglobin A1c    4. Other hyperlipidemia  -     Discontinue: pravastatin " (PRAVACHOL) 10 MG tablet; Take 1 tablet by mouth Daily.  Dispense: 90 tablet; Refill: 3  -     Comprehensive Metabolic Panel  -     Lipid Panel With LDL / HDL Ratio      Assessment & Plan  1. Pain in the right calf.  The patient's symptoms do not raise any indications of a blood clot. She hypothesizes that the pain in the right calf is musculoskeletal in nature, a condition she has experienced numerous times in the past, typically associated with muscle pulling. Currently, she does not exhibit any other symptoms or risk factors for blood clots. The patient was advised to apply ice to the affected area. A prescription for diclofenac gel was issued. Regular blood work will be conducted today.    2. Vitamin d deficiency  Start 57772 iu of vitamin d.    3. Hyperlipidemia  Will check lipid panel at todays visit. She has stopped pravastatin. May need to get back on.     Follow Up   No follow-ups on file.  Patient was given instructions and counseling regarding her condition or for health maintenance advice. Please see specific information pulled into the AVS if appropriate.           Patient or patient representative verbalized consent for the use of Ambient Listening during the visit with  Ambrose Phillips MD for chart documentation. 4/21/2024  11:32 EDT

## 2025-07-31 ENCOUNTER — OFFICE VISIT (OUTPATIENT)
Dept: INTERNAL MEDICINE | Facility: CLINIC | Age: 55
End: 2025-07-31
Payer: MEDICAID

## 2025-07-31 VITALS
HEART RATE: 74 BPM | TEMPERATURE: 97.5 F | DIASTOLIC BLOOD PRESSURE: 64 MMHG | OXYGEN SATURATION: 98 % | BODY MASS INDEX: 29.06 KG/M2 | WEIGHT: 170.2 LBS | HEIGHT: 64 IN | SYSTOLIC BLOOD PRESSURE: 100 MMHG

## 2025-07-31 DIAGNOSIS — E78.49 OTHER HYPERLIPIDEMIA: Primary | ICD-10-CM

## 2025-07-31 DIAGNOSIS — M25.50 CHRONIC PAIN OF MULTIPLE JOINTS: ICD-10-CM

## 2025-07-31 DIAGNOSIS — Z12.31 ENCOUNTER FOR SCREENING MAMMOGRAM FOR MALIGNANT NEOPLASM OF BREAST: ICD-10-CM

## 2025-07-31 DIAGNOSIS — G89.29 CHRONIC PAIN OF BOTH KNEES: ICD-10-CM

## 2025-07-31 DIAGNOSIS — G89.29 CHRONIC PAIN OF MULTIPLE JOINTS: ICD-10-CM

## 2025-07-31 DIAGNOSIS — E55.9 VITAMIN D DEFICIENCY: ICD-10-CM

## 2025-07-31 DIAGNOSIS — J30.9 ALLERGIC RHINITIS, UNSPECIFIED SEASONALITY, UNSPECIFIED TRIGGER: ICD-10-CM

## 2025-07-31 DIAGNOSIS — M25.562 CHRONIC PAIN OF BOTH KNEES: ICD-10-CM

## 2025-07-31 DIAGNOSIS — M25.561 CHRONIC PAIN OF BOTH KNEES: ICD-10-CM

## 2025-07-31 DIAGNOSIS — S86.811A STRAIN OF RIGHT CALF MUSCLE: ICD-10-CM

## 2025-07-31 RX ORDER — DICLOFENAC SODIUM 30 MG/G
GEL TOPICAL 2 TIMES DAILY
Qty: 100 G | Refills: 3 | Status: SHIPPED | OUTPATIENT
Start: 2025-07-31

## 2025-07-31 NOTE — ASSESSMENT & PLAN NOTE
Orders:    CBC & Differential    Comprehensive Metabolic Panel    Hemoglobin A1c    Lipid Panel    C-reactive Protein    Sedimentation Rate    Rheumatoid Factor    CANDIS With / DsDNA, RNP, Sjogrens A / B, Smith

## 2025-07-31 NOTE — ASSESSMENT & PLAN NOTE
Orders:    CBC & Differential    Comprehensive Metabolic Panel    Hemoglobin A1c    Lipid Panel    C-reactive Protein    Sedimentation Rate    Rheumatoid Factor    CANDIS With / DsDNA, RNP, Sjogrens A / B, Buchanan    Ambulatory Referral to Physical Therapy for Evaluation & Treatment    Ambulatory Referral to Orthopedic Surgery

## 2025-07-31 NOTE — ASSESSMENT & PLAN NOTE
{Hyperlipidemia A/P Block (Optional):[id number]}    Orders:    CBC & Differential    Comprehensive Metabolic Panel    Hemoglobin A1c    Lipid Panel    C-reactive Protein    Sedimentation Rate    Rheumatoid Factor    CANDIS With / DsDNA, RNP, Sjogrens A / B, Smith

## 2025-07-31 NOTE — PROGRESS NOTES
"Chief Complaint  Knee Pain and Hand Pain    Hyperlipidemia    Bilateral knee pain    Swelling right breast    Allergic rhinitis    Subjective        Minnie Ortiz presents to [facility]  History of Present Illness  History of Present Illness      The patient presents for evaluation of bilateral knee pain, breast swelling, snoring, and numbness in her extremities.    She continues to experience bilateral knee pain, which is more pronounced in the right knee. This pain has been present for over 6 months. She received a steroid injection previously, which provided some relief, but she has not sought orthopedic consultation for this issue. She reports using diclofenac gel but did not receive it as prescribed.    She reports swelling on the right side of her breast, which she first noticed a few months ago. There is no associated pain. Her last mammogram was in 2018.    She also reports numbness and tingling in both her legs and hands, which she believes may be neuropathy. This sensation is particularly noticeable when she sits down, often occurring within 5 seconds, forcing her to change positions.    Last month, she experienced a fever, cold, and cough, but did not seek medical attention. The fever and cough have since resolved. She typically experiences a runny nose at the onset of a cold, followed by thickening of the mucus. However, during her recent illness, she did not have a runny nose and went straight to having thick mucus. She has been blowing her nose due to the thickness of the mucus, which has resulted in pain. She also reports loud snoring, which is a new symptom for her. Her  has expressed concern about her loud snoring.    She has gained weight and reports overeating when angry.       Objective   Vital Signs:  /64 (BP Location: Left arm, Patient Position: Sitting)   Pulse 74   Temp 97.5 °F (36.4 °C) (Oral)   Ht 162 cm (63.78\")   Wt 77.2 kg (170 lb 3.2 " "oz)   SpO2 98%   BMI 29.42 kg/m²   Estimated body mass index is 29.42 kg/m² as calculated from the following:    Height as of this encounter: 162 cm (63.78\").    Weight as of this encounter: 77.2 kg (170 lb 3.2 oz).       BMI is >= 25 and <30. (Overweight) The following options were offered after discussion;: exercise counseling/recommendations and nutrition counseling/recommendations      Physical Exam  Vitals and nursing note reviewed.   Constitutional:       Appearance: Normal appearance.   HENT:      Head: Normocephalic.      Right Ear: Tympanic membrane, ear canal and external ear normal.      Left Ear: Tympanic membrane, ear canal and external ear normal.      Nose: Nose normal.      Mouth/Throat:      Mouth: Mucous membranes are moist.   Eyes:      Pupils: Pupils are equal, round, and reactive to light.   Cardiovascular:      Rate and Rhythm: Normal rate and regular rhythm.      Pulses: Normal pulses.      Heart sounds: Normal heart sounds.      Comments: No peripheral edema noted  Pulmonary:      Effort: Pulmonary effort is normal. No respiratory distress.      Breath sounds: Normal breath sounds. No stridor. No wheezing, rhonchi or rales.      Comments: Denies SOB  Chest:      Chest wall: No tenderness.   Musculoskeletal:         General: No signs of injury.      Comments: Complains of bilateral knee pain chronic       Skin:     General: Skin is warm.      Capillary Refill: Capillary refill takes less than 2 seconds.   Neurological:      Mental Status: She is alert and oriented to person, place, and time.   Psychiatric:         Behavior: Behavior normal.        Physical Exam  General: No visible distress, slight weight gain noted.  Breast: No visible asymmetry or swelling noted.     Result Review :          Results     Office Visit with Ambrose Phillips MD (04/19/2024)            Assessment and Plan        Other hyperlipidemia       Orders:    CBC & Differential    Comprehensive Metabolic Panel    " Hemoglobin A1c    Lipid Panel    C-reactive Protein    Sedimentation Rate    Rheumatoid Factor    CANDIS With / DsDNA, RNP, Sjogrens A / B, Smith    Vitamin D deficiency    Orders:    CBC & Differential    Comprehensive Metabolic Panel    Hemoglobin A1c    Lipid Panel    C-reactive Protein    Sedimentation Rate    Rheumatoid Factor    CANDIS With / DsDNA, RNP, Sjogrens A / B, Buchanan    Chronic pain of multiple joints    Orders:    CBC & Differential    Comprehensive Metabolic Panel    Hemoglobin A1c    Lipid Panel    C-reactive Protein    Sedimentation Rate    Rheumatoid Factor    CANDIS With / DsDNA, RNP, Sjogrens A / B, Buchanan    Ambulatory Referral to Physical Therapy for Evaluation & Treatment    Ambulatory Referral to Orthopedic Surgery    Strain of right calf muscle    Orders:    Diclofenac Sodium 3 % gel gel; Apply  topically to the appropriate area as directed 2 (Two) Times a Day. for 60 days.    CBC & Differential    Comprehensive Metabolic Panel    Hemoglobin A1c    Lipid Panel    C-reactive Protein    Sedimentation Rate    Rheumatoid Factor    CANDIS With / DsDNA, RNP, Sjogrens A / B, Buchanan    Ambulatory Referral to Physical Therapy for Evaluation & Treatment    Chronic pain of both knees    Orders:    CBC & Differential    Comprehensive Metabolic Panel    Hemoglobin A1c    Lipid Panel    C-reactive Protein    Sedimentation Rate    Rheumatoid Factor    CANDIS With / DsDNA, RNP, Sjogrens A / B, Buchanan    Ambulatory Referral to Physical Therapy for Evaluation & Treatment    Ambulatory Referral to Orthopedic Surgery    Encounter for screening mammogram for malignant neoplasm of breast    Orders:    Mammo Screening Digital Tomosynthesis Bilateral With CAD; Future    CBC & Differential    Comprehensive Metabolic Panel    Hemoglobin A1c    Lipid Panel    C-reactive Protein    Sedimentation Rate    Rheumatoid Factor    CANDIS With / DsDNA, RNP, Sjogrens A / B, Buchanan    Allergic rhinitis, unspecified seasonality, unspecified  trigger            Assessment & Plan  1. Bilateral knee pain.  - Chronic bilateral knee pain persists, with the right knee being more affected.  - Pain duration is more than 6 months.  - Referral to an orthopedic specialist for further evaluation and potential x-rays of the knees.  - Diclofenac gel prescribed for topical application on both knees, to be used twice daily.    2. Breast swelling.  - Swelling in the right breast area noticed for a few months, without associated pain.  - Physical examination did not reveal visible changes in breast size.  - Mammogram ordered to screen for breast cancer.    3. Snoring.  - Increased snoring reported, potentially related to allergies or sleep apnea.  - Over-the-counter Claritin-D recommended to alleviate symptoms.  - If no improvement, referral to ENT specialist or sleep study may be considered.    4. Numbness in extremities.  - Numbness and tingling in both legs and hands reported, suggesting neuropathy or carpal tunnel syndrome.  - Blood work ordered to check inflammatory markers and other relevant parameters.         I spent 30 minutes caring for Minnie on this date of service. This time includes time spent by me in the following activities:preparing for the visit, reviewing tests, obtaining and/or reviewing a separately obtained history, performing a medically appropriate examination and/or evaluation , counseling and educating the patient/family/caregiver, ordering medications, tests, or procedures, referring and communicating with other health care professionals , documenting information in the medical record, independently interpreting results and communicating that information with the patient/family/caregiver, and care coordination  Follow Up     Return in about 3 months (around 10/31/2025) for Annual physical.  Patient was given instructions and counseling regarding her condition or for health maintenance advice. Please see specific information pulled into the  AVS if appropriate.     Patient or patient representative verbalized consent for the use of Ambient Listening during the visit with  JOSÉ MIGUEL Gonzáles for chart documentation. 7/31/2025  13:53 EDT

## 2025-08-01 LAB
ALBUMIN SERPL-MCNC: 4.2 G/DL (ref 3.8–4.9)
ALP SERPL-CCNC: 83 IU/L (ref 44–121)
ALT SERPL-CCNC: 35 IU/L (ref 0–32)
ANA SER QL: NEGATIVE
AST SERPL-CCNC: 29 IU/L (ref 0–40)
BASOPHILS # BLD AUTO: 0 X10E3/UL (ref 0–0.2)
BASOPHILS NFR BLD AUTO: 1 %
BILIRUB SERPL-MCNC: 0.5 MG/DL (ref 0–1.2)
BUN SERPL-MCNC: 11 MG/DL (ref 6–24)
BUN/CREAT SERPL: 16 (ref 9–23)
CALCIUM SERPL-MCNC: 9 MG/DL (ref 8.7–10.2)
CHLORIDE SERPL-SCNC: 104 MMOL/L (ref 96–106)
CHOLEST SERPL-MCNC: 223 MG/DL (ref 100–199)
CO2 SERPL-SCNC: 20 MMOL/L (ref 20–29)
CREAT SERPL-MCNC: 0.68 MG/DL (ref 0.57–1)
CRP SERPL-MCNC: 1 MG/L (ref 0–10)
EGFRCR SERPLBLD CKD-EPI 2021: 105 ML/MIN/1.73
EOSINOPHIL # BLD AUTO: 0.2 X10E3/UL (ref 0–0.4)
EOSINOPHIL NFR BLD AUTO: 2 %
ERYTHROCYTE [DISTWIDTH] IN BLOOD BY AUTOMATED COUNT: 13 % (ref 11.7–15.4)
ERYTHROCYTE [SEDIMENTATION RATE] IN BLOOD BY WESTERGREN METHOD: 4 MM/HR (ref 0–40)
GLOBULIN SER CALC-MCNC: 2.9 G/DL (ref 1.5–4.5)
GLUCOSE SERPL-MCNC: 110 MG/DL (ref 70–99)
HBA1C MFR BLD: 5.9 % (ref 4.8–5.6)
HCT VFR BLD AUTO: 43.3 % (ref 34–46.6)
HDLC SERPL-MCNC: 42 MG/DL
HGB BLD-MCNC: 14.2 G/DL (ref 11.1–15.9)
IMM GRANULOCYTES # BLD AUTO: 0 X10E3/UL (ref 0–0.1)
IMM GRANULOCYTES NFR BLD AUTO: 0 %
LDLC SERPL CALC-MCNC: 131 MG/DL (ref 0–99)
LYMPHOCYTES # BLD AUTO: 2.8 X10E3/UL (ref 0.7–3.1)
LYMPHOCYTES NFR BLD AUTO: 36 %
MCH RBC QN AUTO: 29.6 PG (ref 26.6–33)
MCHC RBC AUTO-ENTMCNC: 32.8 G/DL (ref 31.5–35.7)
MCV RBC AUTO: 90 FL (ref 79–97)
MONOCYTES # BLD AUTO: 0.5 X10E3/UL (ref 0.1–0.9)
MONOCYTES NFR BLD AUTO: 6 %
NEUTROPHILS # BLD AUTO: 4.2 X10E3/UL (ref 1.4–7)
NEUTROPHILS NFR BLD AUTO: 55 %
PLATELET # BLD AUTO: 274 X10E3/UL (ref 150–450)
POTASSIUM SERPL-SCNC: 3.8 MMOL/L (ref 3.5–5.2)
PROT SERPL-MCNC: 7.1 G/DL (ref 6–8.5)
RBC # BLD AUTO: 4.8 X10E6/UL (ref 3.77–5.28)
RHEUMATOID FACT SERPL-ACNC: <10 IU/ML
SODIUM SERPL-SCNC: 140 MMOL/L (ref 134–144)
TRIGL SERPL-MCNC: 278 MG/DL (ref 0–149)
VLDLC SERPL CALC-MCNC: 50 MG/DL (ref 5–40)
WBC # BLD AUTO: 7.7 X10E3/UL (ref 3.4–10.8)

## 2025-08-08 ENCOUNTER — TELEPHONE (OUTPATIENT)
Dept: INTERNAL MEDICINE | Facility: CLINIC | Age: 55
End: 2025-08-08
Payer: MEDICAID

## 2025-08-09 DIAGNOSIS — E55.9 VITAMIN D DEFICIENCY: ICD-10-CM

## 2025-08-11 RX ORDER — ERGOCALCIFEROL 1.25 MG/1
50000 CAPSULE, LIQUID FILLED ORAL WEEKLY
Qty: 4 CAPSULE | Refills: 0 | Status: SHIPPED | OUTPATIENT
Start: 2025-08-11

## 2025-08-15 ENCOUNTER — HOSPITAL ENCOUNTER (OUTPATIENT)
Dept: MAMMOGRAPHY | Facility: HOSPITAL | Age: 55
Discharge: HOME | End: 2025-08-15
Admitting: NURSE PRACTITIONER
Payer: MEDICAID

## 2025-08-15 DIAGNOSIS — Z12.31 ENCOUNTER FOR SCREENING MAMMOGRAM FOR MALIGNANT NEOPLASM OF BREAST: ICD-10-CM

## 2025-08-15 PROCEDURE — 77063 BREAST TOMOSYNTHESIS BI: CPT

## 2025-08-15 PROCEDURE — 77067 SCR MAMMO BI INCL CAD: CPT
